# Patient Record
Sex: FEMALE | Race: WHITE | Employment: FULL TIME | URBAN - METROPOLITAN AREA
[De-identification: names, ages, dates, MRNs, and addresses within clinical notes are randomized per-mention and may not be internally consistent; named-entity substitution may affect disease eponyms.]

---

## 2017-01-26 ENCOUNTER — HOSPITAL ENCOUNTER (OUTPATIENT)
Dept: ULTRASOUND IMAGING | Age: 37
Discharge: HOME | End: 2017-01-26
Attending: OBSTETRICS & GYNECOLOGY | Admitting: OBSTETRICS & GYNECOLOGY

## 2017-01-26 DIAGNOSIS — R92.8 ABNORMAL MAMMOGRAM: ICD-10-CM

## 2017-01-26 DIAGNOSIS — Z12.31 VISIT FOR SCREENING MAMMOGRAM: ICD-10-CM

## 2017-12-14 ENCOUNTER — HOSPITAL ENCOUNTER (OUTPATIENT)
Dept: MAMMOGRAPHY | Age: 37
Discharge: HOME | End: 2017-12-14

## 2017-12-14 DIAGNOSIS — Z12.31 VISIT FOR SCREENING MAMMOGRAM: ICD-10-CM

## 2018-03-12 ENCOUNTER — HOSPITAL ENCOUNTER (OUTPATIENT)
Dept: ULTRASOUND IMAGING | Age: 38
Discharge: HOME | End: 2018-03-12
Attending: OBSTETRICS & GYNECOLOGY | Admitting: OBSTETRICS & GYNECOLOGY

## 2018-03-12 DIAGNOSIS — N92.0 EXCESSIVE OR FREQUENT MENSTRUATION: ICD-10-CM

## 2018-03-12 DIAGNOSIS — N92.0 EXCESSIVE AND FREQUENT MENSTRUATION WITH REGULAR CYCLE: ICD-10-CM

## 2020-02-05 LAB
ALBUMIN SERPL-MCNC: 4.6 G/DL
ALP BLD-CCNC: 100 U/L
ALT SERPL-CCNC: 30 U/L
ANION GAP SERPL CALCULATED.3IONS-SCNC: NORMAL MMOL/L
AST SERPL-CCNC: 17 U/L
AVERAGE GLUCOSE: NORMAL
BASOPHILS ABSOLUTE: 0.1 /ΜL
BASOPHILS RELATIVE PERCENT: 1 %
BILIRUB SERPL-MCNC: 0.2 MG/DL (ref 0.1–1.4)
BUN BLDV-MCNC: 12 MG/DL
CALCIUM SERPL-MCNC: 10.7 MG/DL
CHLORIDE BLD-SCNC: 105 MMOL/L
CHOLESTEROL, TOTAL: 165 MG/DL
CHOLESTEROL/HDL RATIO: 4.1
CO2: NORMAL
CREAT SERPL-MCNC: 0.8 MG/DL
EOSINOPHILS ABSOLUTE: 0.2 /ΜL
EOSINOPHILS RELATIVE PERCENT: 1 %
GFR CALCULATED: NORMAL
GLUCOSE BLD-MCNC: 98 MG/DL
HBA1C MFR BLD: 5.2 %
HCT VFR BLD CALC: 46.7 % (ref 36–46)
HDLC SERPL-MCNC: 40 MG/DL (ref 35–70)
HEMOGLOBIN: 14.9 G/DL (ref 12–16)
LDL CHOLESTEROL CALCULATED: 91 MG/DL (ref 0–160)
LYMPHOCYTES ABSOLUTE: 2.6 /ΜL
LYMPHOCYTES RELATIVE PERCENT: 23 %
MCH RBC QN AUTO: 26.5 PG
MCHC RBC AUTO-ENTMCNC: 31.9 G/DL
MCV RBC AUTO: 83 FL
MONOCYTES ABSOLUTE: 0.7 /ΜL
MONOCYTES RELATIVE PERCENT: 6 %
NEUTROPHILS ABSOLUTE: 7.5 /ΜL
NEUTROPHILS RELATIVE PERCENT: 68 %
PDW BLD-RTO: 13.8 %
PLATELET # BLD: 221 K/ΜL
PMV BLD AUTO: ABNORMAL FL
POTASSIUM SERPL-SCNC: 4.8 MMOL/L
RBC # BLD: 5.63 10^6/ΜL
SODIUM BLD-SCNC: 141 MMOL/L
TOTAL PROTEIN: 7.6
TRIGL SERPL-MCNC: 171 MG/DL
VLDLC SERPL CALC-MCNC: NORMAL MG/DL
WBC # BLD: 11.1 10^3/ML

## 2020-03-11 ENCOUNTER — OFFICE VISIT (OUTPATIENT)
Dept: INTERNAL MEDICINE CLINIC | Age: 40
End: 2020-03-11
Payer: COMMERCIAL

## 2020-03-11 VITALS
DIASTOLIC BLOOD PRESSURE: 84 MMHG | SYSTOLIC BLOOD PRESSURE: 132 MMHG | HEART RATE: 80 BPM | OXYGEN SATURATION: 99 % | HEIGHT: 68 IN | BODY MASS INDEX: 36.98 KG/M2 | WEIGHT: 244 LBS

## 2020-03-11 PROCEDURE — 99385 PREV VISIT NEW AGE 18-39: CPT | Performed by: INTERNAL MEDICINE

## 2020-03-11 RX ORDER — M-VIT,TX,IRON,MINS/CALC/FOLIC 27MG-0.4MG
1 TABLET ORAL DAILY
COMMUNITY

## 2020-03-11 ASSESSMENT — PATIENT HEALTH QUESTIONNAIRE - PHQ9
SUM OF ALL RESPONSES TO PHQ QUESTIONS 1-9: 0
1. LITTLE INTEREST OR PLEASURE IN DOING THINGS: 0
SUM OF ALL RESPONSES TO PHQ9 QUESTIONS 1 & 2: 0
2. FEELING DOWN, DEPRESSED OR HOPELESS: 0
SUM OF ALL RESPONSES TO PHQ QUESTIONS 1-9: 0

## 2020-03-11 NOTE — PROGRESS NOTES
WITH LEFT PARA-TUBAL CYSTECTOMY, AND LYSIS OF ADHESIONS.     LASIK      TYMPANOPLASTY  1993    ear drum age 10     Family History   Problem Relation Age of Onset    Heart Disease Father     Stroke Father     High Blood Pressure Sister     Heart Attack Maternal Grandmother     Heart Disease Maternal Grandmother     Heart Attack Maternal Grandfather     Heart Disease Maternal Grandfather     Heart Attack Paternal Grandmother     Heart Disease Paternal Grandmother      Social History     Socioeconomic History    Marital status: Single     Spouse name: Not on file    Number of children: Not on file    Years of education: Not on file    Highest education level: Not on file   Occupational History    Occupation: surgical dental assistant   Social Needs    Financial resource strain: Not on file    Food insecurity     Worry: Not on file     Inability: Not on file    Transportation needs     Medical: Not on file     Non-medical: Not on file   Tobacco Use    Smoking status: Never Smoker    Smokeless tobacco: Never Used   Substance and Sexual Activity    Alcohol use: Yes     Comment: socially    Drug use: No    Sexual activity: Yes   Lifestyle    Physical activity     Days per week: Not on file     Minutes per session: Not on file    Stress: Not on file   Relationships    Social connections     Talks on phone: Not on file     Gets together: Not on file     Attends Sabianism service: Not on file     Active member of club or organization: Not on file     Attends meetings of clubs or organizations: Not on file     Relationship status: Not on file    Intimate partner violence     Fear of current or ex partner: Not on file     Emotionally abused: Not on file     Physically abused: Not on file     Forced sexual activity: Not on file   Other Topics Concern    Not on file   Social History Narrative    Not on file       Review of Systems:  A comprehensive review of systems was negative except for what was noted in the HPI. Physical Exam:   Vitals:    03/11/20 1245   BP: 132/84   Pulse: 80   SpO2: 99%   Weight: 244 lb (110.7 kg)   Height: 5' 7.5\" (1.715 m)     Body mass index is 37.65 kg/m². Constitutional: She is oriented to person, place, and time. She appears well-developed and well-nourished. No distress. HEENT:   Head: Normocephalic and atraumatic. Right Ear: Tympanic membrane, external ear and ear canal normal.   Left Ear: Tympanic membrane, external ear and ear canal normal.   Mouth/Throat: Oropharynx is clear and moist, and mucous membranes are normal.  There is no cervical adenopathy. Eyes: Conjunctivae and extraocular motions are normal. Pupils are equal, round, and reactive to light. Neck: Supple. No JVD present. Carotid bruit is not present. No mass and no thyromegaly present. Cardiovascular: Normal rate, regular rhythm, normal heart sounds and intact distal pulses. Exam reveals no gallop and no friction rub. No murmur heard. Pulmonary/Chest: Effort normal and breath sounds normal. No respiratory distress. She has no wheezes, rhonchi or rales. Abdominal: Soft, non-tender. Bowel sounds and aorta are normal. She exhibits no organomegaly, mass or bruit. Genitourinary: performed by gynecologist.  Breast exam:  performed by specialist.  Musculoskeletal: Normal range of motion, no synovitis. She exhibits no edema. Neurological: She is alert and oriented to person, place, and time. She has normal reflexes. No cranial nerve deficit. Coordination normal.   Skin: Skin is warm and dry. There is no rash or erythema. No suspicious lesions noted. Psychiatric: She has a normal mood and affect.  Her speech is normal and behavior is normal. Judgment, cognition and memory are normal.       Preventive Care:  Health Maintenance   Topic Date Due    Varicella vaccine (1 of 2 - 2-dose childhood series) 06/15/1983    HIV screen  06/15/1997    Cervical cancer screen  06/15/2003    DTaP/Tdap/Td vaccine (2 - Td) 02/23/2022    Shingles Vaccine (1 of 2) 06/15/2032    Flu vaccine  Completed    Hepatitis A vaccine  Aged Out    Hepatitis B vaccine  Aged Out    Hib vaccine  Aged Out    Meningococcal (ACWY) vaccine  Aged Out    Pneumococcal 0-64 years Vaccine  Aged Out      Hx abnormal PAP: no  Sexual activity: has sex with males   Last eye exam: 2018, normal  Exercise: walks 3 time(s) per week       Preventive plan of care for Mary Jane Natarajan        3/11/2020           Preventive Measures Status       Recommendations for screening                   Diabetes Screen  Glucose (mg/dL)   Date Value   02/05/2020 98    Repeat yearly   Cholesterol Screen  Lab Results   Component Value Date    CHOL 165 02/05/2020    TRIG 171 02/05/2020    HDL 40 02/05/2020    LDLCALC 91 02/05/2020    Repeat yearly       Weight: Body mass index is 37.65 kg/m². 5' 7.5\" (1.715 m)244 lb (110.7 kg)    Your BMI is 25 or greater, which indicates that you are overweight        Recommended Immunizations    Immunization History   Administered Date(s) Administered    Influenza Virus Vaccine 10/01/2019        Influenza vaccine:  recommended every fall         Other Recommendations ·   See a dentist every 6 months  · Try to get at least 30 minutes of exercise 3-5 days per week  · Always wear a seat belt when traveling in a car  · Always wear a helmet when riding a bicycle or motorcycle  · When exposed to the sun, use a sunscreen that protects against both UVA and UVB radiation with an SPF of 30 or greater- reapply every 2 to 3 hours or after sweating, drying off with a towel, or swimming  · You need 500 mg of calcium and 1184-9662 IU of vitamin D per day- it is possible to meet your calcium requirement with diet alone, but a vitamin D supplement is usually necessary                 Assessment/Plan:    Henrietta Aldrich was seen today for establish care.     Diagnoses and all orders for this visit:    Annual physical exam        Return Fasting Physical in 1 year.

## 2022-02-07 ENCOUNTER — OFFICE VISIT (OUTPATIENT)
Dept: INTERNAL MEDICINE CLINIC | Age: 42
End: 2022-02-07
Payer: COMMERCIAL

## 2022-02-07 VITALS
WEIGHT: 253 LBS | OXYGEN SATURATION: 98 % | BODY MASS INDEX: 38.34 KG/M2 | HEART RATE: 91 BPM | SYSTOLIC BLOOD PRESSURE: 132 MMHG | DIASTOLIC BLOOD PRESSURE: 80 MMHG | HEIGHT: 68 IN

## 2022-02-07 DIAGNOSIS — F32.A ANXIETY AND DEPRESSION: ICD-10-CM

## 2022-02-07 DIAGNOSIS — F41.9 ANXIETY AND DEPRESSION: ICD-10-CM

## 2022-02-07 DIAGNOSIS — Z00.00 ENCOUNTER FOR WELL ADULT EXAM WITHOUT ABNORMAL FINDINGS: Primary | ICD-10-CM

## 2022-02-07 LAB
A/G RATIO: 1.4 (ref 1.1–2.2)
ALBUMIN SERPL-MCNC: 4.2 G/DL (ref 3.4–5)
ALP BLD-CCNC: 92 U/L (ref 40–129)
ALT SERPL-CCNC: 19 U/L (ref 10–40)
ANION GAP SERPL CALCULATED.3IONS-SCNC: 14 MMOL/L (ref 3–16)
AST SERPL-CCNC: 18 U/L (ref 15–37)
BASOPHILS ABSOLUTE: 0.1 K/UL (ref 0–0.2)
BASOPHILS RELATIVE PERCENT: 0.6 %
BILIRUB SERPL-MCNC: 0.3 MG/DL (ref 0–1)
BUN BLDV-MCNC: 15 MG/DL (ref 7–20)
CALCIUM SERPL-MCNC: 10.5 MG/DL (ref 8.3–10.6)
CHLORIDE BLD-SCNC: 108 MMOL/L (ref 99–110)
CHOLESTEROL, TOTAL: 188 MG/DL (ref 0–199)
CO2: 21 MMOL/L (ref 21–32)
CREAT SERPL-MCNC: 0.8 MG/DL (ref 0.6–1.1)
EOSINOPHILS ABSOLUTE: 0.1 K/UL (ref 0–0.6)
EOSINOPHILS RELATIVE PERCENT: 1.4 %
GFR AFRICAN AMERICAN: >60
GFR NON-AFRICAN AMERICAN: >60
GLUCOSE BLD-MCNC: 92 MG/DL (ref 70–99)
HCT VFR BLD CALC: 43.2 % (ref 36–48)
HDLC SERPL-MCNC: 39 MG/DL (ref 40–60)
HEMOGLOBIN: 14.1 G/DL (ref 12–16)
LDL CHOLESTEROL CALCULATED: 124 MG/DL
LYMPHOCYTES ABSOLUTE: 2.5 K/UL (ref 1–5.1)
LYMPHOCYTES RELATIVE PERCENT: 24.3 %
MCH RBC QN AUTO: 28 PG (ref 26–34)
MCHC RBC AUTO-ENTMCNC: 32.6 G/DL (ref 31–36)
MCV RBC AUTO: 85.7 FL (ref 80–100)
MONOCYTES ABSOLUTE: 0.6 K/UL (ref 0–1.3)
MONOCYTES RELATIVE PERCENT: 5.6 %
NEUTROPHILS ABSOLUTE: 7 K/UL (ref 1.7–7.7)
NEUTROPHILS RELATIVE PERCENT: 68.1 %
PDW BLD-RTO: 15.1 % (ref 12.4–15.4)
PLATELET # BLD: 196 K/UL (ref 135–450)
PMV BLD AUTO: 11.1 FL (ref 5–10.5)
POTASSIUM SERPL-SCNC: 4.4 MMOL/L (ref 3.5–5.1)
RBC # BLD: 5.04 M/UL (ref 4–5.2)
SODIUM BLD-SCNC: 143 MMOL/L (ref 136–145)
TOTAL PROTEIN: 7.2 G/DL (ref 6.4–8.2)
TRIGL SERPL-MCNC: 124 MG/DL (ref 0–150)
TSH REFLEX: 1.6 UIU/ML (ref 0.27–4.2)
VLDLC SERPL CALC-MCNC: 25 MG/DL
WBC # BLD: 10.2 K/UL (ref 4–11)

## 2022-02-07 PROCEDURE — 99395 PREV VISIT EST AGE 18-39: CPT | Performed by: INTERNAL MEDICINE

## 2022-02-07 PROCEDURE — 36415 COLL VENOUS BLD VENIPUNCTURE: CPT | Performed by: INTERNAL MEDICINE

## 2022-02-07 PROCEDURE — 99213 OFFICE O/P EST LOW 20 MIN: CPT | Performed by: INTERNAL MEDICINE

## 2022-02-07 SDOH — ECONOMIC STABILITY: FOOD INSECURITY: WITHIN THE PAST 12 MONTHS, YOU WORRIED THAT YOUR FOOD WOULD RUN OUT BEFORE YOU GOT MONEY TO BUY MORE.: NEVER TRUE

## 2022-02-07 SDOH — ECONOMIC STABILITY: FOOD INSECURITY: WITHIN THE PAST 12 MONTHS, THE FOOD YOU BOUGHT JUST DIDN'T LAST AND YOU DIDN'T HAVE MONEY TO GET MORE.: NEVER TRUE

## 2022-02-07 ASSESSMENT — PATIENT HEALTH QUESTIONNAIRE - PHQ9
SUM OF ALL RESPONSES TO PHQ QUESTIONS 1-9: 2
SUM OF ALL RESPONSES TO PHQ QUESTIONS 1-9: 2
SUM OF ALL RESPONSES TO PHQ9 QUESTIONS 1 & 2: 2
SUM OF ALL RESPONSES TO PHQ QUESTIONS 1-9: 2
2. FEELING DOWN, DEPRESSED OR HOPELESS: 2
SUM OF ALL RESPONSES TO PHQ QUESTIONS 1-9: 2
1. LITTLE INTEREST OR PLEASURE IN DOING THINGS: 0

## 2022-02-07 ASSESSMENT — SOCIAL DETERMINANTS OF HEALTH (SDOH): HOW HARD IS IT FOR YOU TO PAY FOR THE VERY BASICS LIKE FOOD, HOUSING, MEDICAL CARE, AND HEATING?: NOT HARD AT ALL

## 2022-02-07 NOTE — PATIENT INSTRUCTIONS
Body Mass Index: Care Instructions  Your Care Instructions     Body mass index (BMI) can help you see if your weight is raising your risk for health problems. It uses a formula to compare how much you weigh with how tall you are. · A BMI lower than 18.5 is considered underweight. · A BMI between 18.5 and 24.9 is considered healthy. · A BMI between 25 and 29.9 is considered overweight. A BMI of 30 or higher is considered obese. If your BMI is in the normal range, it means that you have a lower risk for weight-related health problems. If your BMI is in the overweight or obese range, you may be at increased risk for weight-related health problems, such as high blood pressure, heart disease, stroke, arthritis or joint pain, and diabetes. If your BMI is in the underweight range, you may be at increased risk for health problems such as fatigue, lower protection (immunity) against illness, muscle loss, bone loss, hair loss, and hormone problems. BMI is just one measure of your risk for weight-related health problems. You may be at higher risk for health problems if you are not active, you eat an unhealthy diet, or you drink too much alcohol or use tobacco products. Follow-up care is a key part of your treatment and safety. Be sure to make and go to all appointments, and call your doctor if you are having problems. It's also a good idea to know your test results and keep a list of the medicines you take. How can you care for yourself at home? · Practice healthy eating habits. This includes eating plenty of fruits, vegetables, whole grains, lean protein, and low-fat dairy. · If your doctor recommends it, get more exercise. Walking is a good choice. Bit by bit, increase the amount you walk every day. Try for at least 30 minutes on most days of the week. · Do not smoke. Smoking can increase your risk for health problems. If you need help quitting, talk to your doctor about stop-smoking programs and medicines. These can increase your chances of quitting for good. · Limit alcohol to 2 drinks a day for men and 1 drink a day for women. Too much alcohol can cause health problems. If you have a BMI higher than 25  · Your doctor may do other tests to check your risk for weight-related health problems. This may include measuring the distance around your waist. A waist measurement of more than 40 inches in men or 35 inches in women can increase the risk of weight-related health problems. · Talk with your doctor about steps you can take to stay healthy or improve your health. You may need to make lifestyle changes to lose weight and stay healthy, such as changing your diet and getting regular exercise. If you have a BMI lower than 18.5  · Your doctor may do other tests to check your risk for health problems. · Talk with your doctor about steps you can take to stay healthy or improve your health. You may need to make lifestyle changes to gain or maintain weight and stay healthy, such as getting more healthy foods in your diet and doing exercises to build muscle. Where can you learn more? Go to https://[employer].Delivered. org and sign in to your [employer] account. Enter S176 in the [employer] pte box to learn more about \"Body Mass Index: Care Instructions. \"     If you do not have an account, please click on the \"Sign Up Now\" link. Current as of: March 17, 2021               Content Version: 13.1  © 8281-0928 Healthwise, Incorporated. Care instructions adapted under license by [facility] ([facility]). If you have questions about a medical condition or this instruction, always ask your healthcare professional. [place] [address] any warranty or liability for your use of this information. Well Visit, Ages 25 to 48: Care Instructions  Overview     Well visits can help you stay healthy. Your doctor has checked your overall health and may have suggested ways to take good care of yourself.  Your doctor also may have recommended tests. At home, you can help prevent illness with healthy eating, regular exercise, and other steps. Follow-up care is a key part of your treatment and safety. Be sure to make and go to all appointments, and call your doctor if you are having problems. It's also a good idea to know your test results and keep a list of the medicines you take. How can you care for yourself at home? · Get screening tests that you and your doctor decide on. Screening helps find diseases before any symptoms appear. · Eat healthy foods. Choose fruits, vegetables, whole grains, protein, and low-fat dairy foods. Limit fat, especially saturated fat. Reduce salt in your diet. · Limit alcohol. If you are a man, have no more than 2 drinks a day or 14 drinks a week. If you are a woman, have no more than 1 drink a day or 7 drinks a week. · Get at least 30 minutes of physical activity on most days of the week. Walking is a good choice. You also may want to do other activities, such as running, swimming, cycling, or playing tennis or team sports. Discuss any changes in your exercise program with your doctor. · Reach and stay at a healthy weight. This will lower your risk for many problems, such as obesity, diabetes, heart disease, and high blood pressure. · Do not smoke or allow others to smoke around you. If you need help quitting, talk to your doctor about stop-smoking programs and medicines. These can increase your chances of quitting for good. · Care for your mental health. It is easy to get weighed down by worry and stress. Learn strategies to manage stress, like deep breathing and mindfulness, and stay connected with your family and community. If you find you often feel sad or hopeless, talk with your doctor. Treatment can help. · Talk to your doctor about whether you have any risk factors for sexually transmitted infections (STIs).  You can help prevent STIs if you wait to have sex with a new partner (or partners) until [facility] each been tested for STIs. It also helps if you use condoms (male or female condoms) and if you limit your sex partners to one person who only has sex with you. Vaccines are available for some STIs, such as HPV. · Use birth control if it's important to you to prevent pregnancy. Talk with your doctor about the choices available and what might be best for you. · If you think you may have a problem with alcohol or drug use, talk to your doctor. This includes prescription medicines (such as amphetamines and opioids) and illegal drugs (such as cocaine and methamphetamine). Your doctor can help you figure out what type of treatment is best for you. · Protect your skin from too much sun. When you're outdoors from 10 a.m. to 4 p.m., stay in the shade or cover up with clothing and a hat with a wide brim. Wear sunglasses that block UV rays. Even when it's cloudy, put broad-spectrum sunscreen (SPF 30 or higher) on any exposed skin. · See a dentist one or two times a year for checkups and to have your teeth cleaned. · Wear a seat belt in the car. When should you call for help? Watch closely for changes in your health, and be sure to contact your doctor if you have any problems or symptoms that concern you. Where can you learn more? Go to https://[employer] Medicalcheryleeb.healthNetlogpartners. org and sign in to your [employer] account. Enter P072 in the [facility] box to learn more about \"Well Visit, Ages 25 to 48: Care Instructions. \"     If you do not have an account, please click on the \"Sign Up Now\" link. Current as of: October 6, 2021               Content Version: 13.1  © 1445-0545 Healthwise, Incorporated. Care instructions adapted under license by [facility] ([facility]). If you have questions about a medical condition or this instruction, always ask your healthcare professional. [address] any warranty or liability for your use of this information.

## 2022-02-07 NOTE — PROGRESS NOTES
[facility]  History and Physical  Yonny Eric M.D.        Lynda Flores  YOB: 1980    Date of Service:  2/7/2022    Chief Complaint:   Lynda Flores is a 46 y.o. female who presents for   Chief Complaint   Patient presents with    Annual Exam    Depression    Anxiety       Assessment/Plan:    Shy Yanez was seen today for annual exam, depression and anxiety. Diagnoses and all orders for this visit:    Encounter for well adult exam without abnormal findings  -     Lipid Panel  -     Comprehensive Metabolic Panel  -     CBC Auto Differential  -     TSH with Reflex    Anxiety and depression  Start  sertraline (ZOLOFT) 50 MG tablet; Take 1/2-1 tablet by mouth daily      Return VV 1 month anxiety/depression. HPI: Here for Annual Physical and Follow up. Anxiety and depression for couple of years:  She's been more emotional and crying weekly with increase stress with her fiancee and divorce 2016 and only see her kids every other week. She's still able to get out of bed, clean the house and go to work. She's just not happy. No suicidal ideation or Homocidal ideation. She complain of panic attacks with increase heart rate intermittentlly but not last too long.     Lab Results   Component Value Date    LABA1C 5.2 02/05/2020     Lab Results   Component Value Date     02/05/2020    K 4.8 02/05/2020     02/05/2020    BUN 12 02/05/2020    CREATININE 0.80 02/05/2020    GLUCOSE 98 02/05/2020    CALCIUM 10.7 02/05/2020     Lab Results   Component Value Date    CHOL 165 02/05/2020    TRIG 171 02/05/2020    HDL 40 02/05/2020    LDLCALC 91 02/05/2020     Lab Results   Component Value Date    ALT 30 02/05/2020    AST 17 02/05/2020     No results found for: TSH, T4FREE  Lab Results   Component Value Date    WBC 11.1 02/05/2020    HGB 14.9 02/05/2020    HCT 46.7 (A) 02/05/2020    MCV 83 02/05/2020     02/05/2020     No results found for: INR   No results found for: PSA   No results found for: LABURIC     Wt Readings from Last 3 Encounters:   22 253 lb (114.8 kg)   20 244 lb (110.7 kg)   09/15/16 234 lb (106.1 kg)     BP Readings from Last 3 Encounters:   22 132/80   20 132/84   09/15/16 143/75       Patient Active Problem List   Diagnosis    Adnexal cyst    Menorrhagia       No Known Allergies  No outpatient medications have been marked as taking for the 22 encounter (Office Visit) with Castillo Clay MD.       History reviewed. No pertinent past medical history. Past Surgical History:   Procedure Laterality Date     SECTION  , 2012    x 2    DILATION AND CURETTAGE OF UTERUS N/A 09/15/2016    DILATATION AND CURETTAGE, HYSTEROSCOPY,    HYSTERECTOMY, VAGINAL  2018    LAPAROSCOPY N/A 09/15/2016    WITH LEFT PARA-TUBAL CYSTECTOMY, AND LYSIS OF ADHESIONS.     LASIK      TYMPANOPLASTY  1993    ear drum age 10     Family History   Problem Relation Age of Onset    Breast Cancer Mother     Heart Disease Father     Stroke Father     Heart Attack Father 48    High Blood Pressure Father     Heart Attack Maternal Grandmother 61    Heart Disease Maternal Grandmother     High Blood Pressure Maternal Grandmother     Heart Attack Paternal Grandmother 61    Heart Disease Paternal Grandmother     High Blood Pressure Paternal Grandmother     Heart Attack Paternal Grandfather 79    Heart Disease Paternal Grandfather     High Blood Pressure Paternal Grandfather      Social History     Socioeconomic History    Marital status: Single     Spouse name: Not on file    Number of children: Not on file    Years of education: Not on file    Highest education level: Not on file   Occupational History    Occupation: surgical dental assistant   Tobacco Use    Smoking status: Never Smoker    Smokeless tobacco: Never Used   Vaping Use    Vaping Use: Never used   Substance and Sexual Activity    Alcohol use: Yes     Comment: socially  Drug use: No    Sexual activity: Yes   Other Topics Concern    Not on file   Social History Narrative    Not on file     Social Determinants of Health     Financial Resource Strain: Low Risk     Difficulty of Paying Living Expenses: Not hard at all   Food Insecurity: No Food Insecurity    Worried About Running Out of Food in the Last Year: Never true    [address] in the Last Year: Never true   Transportation Needs:     Lack of Transportation (Medical): Not on file    Lack of Transportation (Non-Medical): Not on file   Physical Activity:     Days of Exercise per Week: Not on file    Minutes of Exercise per Session: Not on file   Stress:     Feeling of Stress : Not on file   Social Connections:     Frequency of Communication with Friends and Family: Not on file    Frequency of Social Gatherings with Friends and Family: Not on file    Attends Christianity Services: Not on file    Active Member of [address] [place] [address] [employer] or Organizations: Not on file    Attends Club or Organization Meetings: Not on file    Marital Status: Not on file   Intimate Partner Violence:     Fear of Current or Ex-Partner: Not on file    Emotionally Abused: Not on file    Physically Abused: Not on file    Sexually Abused: Not on file   Housing Stability:     Unable to Pay for Housing in the Last Year: Not on file    Number of [place] in the Last Year: Not on file    Unstable Housing in the Last Year: Not on file       Review of Systems:  A comprehensive review of systems was negative except for what was noted in the HPI. Physical Exam:   Vitals:    02/07/22 0925   BP: 132/80   Pulse: 91   SpO2: 98%   Weight: 253 lb (114.8 kg)   Height: 5' 7.5\" (1.715 m)     Body mass index is 39.04 kg/m². Constitutional: She is oriented to person, place, and time. She appears well-developed and well-nourished. No distress. HEENT:   Head: Normocephalic and atraumatic.    Right Ear: Tympanic membrane, external ear and ear canal normal. Left Ear: Tympanic membrane, external ear and ear canal normal.   Mouth/Throat: Oropharynx is clear and moist, and mucous membranes are normal.  There is no cervical adenopathy. Eyes: Conjunctivae and extraocular motions are normal. Pupils are equal, round, and reactive to light. Neck: Supple. No JVD present. Carotid bruit is not present. No mass and no thyromegaly present. Cardiovascular: Normal rate, regular rhythm, normal heart sounds and intact distal pulses. Pulmonary/Chest: Effort normal and breath sounds normal. No respiratory distress. She has no wheezes, rhonchi or rales. Abdominal: Soft, non-tender. Bowel sounds and aorta are normal. She exhibits no organomegaly, mass or bruit. Musculoskeletal: Normal range of motion, no synovitis. She exhibits no edema. Neurological: She is alert and oriented to person, place, and time. She has normal reflexes. No cranial nerve deficit. Coordination normal.   Skin: Skin is warm and dry. There is no rash or erythema. No suspicious lesions noted.        Preventive Care:  Health Maintenance   Topic Date Due    Depression Screen  Never done    COVID-19 Vaccine (3 - Booster for Pfizer series) 07/23/2021    DTaP/Tdap/Td vaccine (2 - Td or Tdap) 02/23/2022    Breast cancer screen  06/15/2022    Flu vaccine  Completed    Hepatitis A vaccine  Aged Out    Hepatitis B vaccine  Aged Out    Hib vaccine  Aged Out    Meningococcal (ACWY) vaccine  Aged Out    Pneumococcal 0-64 years Vaccine  Aged Out    Varicella vaccine  Discontinued    Hepatitis C screen  Discontinued    HIV screen  Discontinued        Recommendations for Preventive Services Due: see orders and patient instructions/AVS.

## 2022-02-08 LAB — MAMMOGRAPHY, EXTERNAL: NORMAL

## 2022-03-07 ENCOUNTER — TELEMEDICINE (OUTPATIENT)
Dept: INTERNAL MEDICINE CLINIC | Age: 42
End: 2022-03-07
Payer: COMMERCIAL

## 2022-03-07 DIAGNOSIS — F32.A ANXIETY AND DEPRESSION: ICD-10-CM

## 2022-03-07 DIAGNOSIS — F41.9 ANXIETY AND DEPRESSION: ICD-10-CM

## 2022-03-07 PROCEDURE — 99213 OFFICE O/P EST LOW 20 MIN: CPT | Performed by: INTERNAL MEDICINE

## 2022-03-07 NOTE — PROGRESS NOTES
14.1 02/07/2022    HCT 43.2 02/07/2022    MCV 85.7 02/07/2022     02/07/2022     No results found for: INR   No results found for: PSA   No results found for: [facility]     Patient Active Problem List   Diagnosis    Adnexal cyst    Menorrhagia       No Known Allergies  Outpatient Medications Marked as Taking for the 3/7/22 encounter (Telemedicine) with Santiago Pruitt MD   Medication Sig Dispense Refill    sertraline (ZOLOFT) 50 MG tablet Take 1 tablet by mouth daily 30 tablet 0    Multiple Vitamins-Minerals (THERAPEUTIC MULTIVITAMIN-MINERALS) tablet Take 1 tablet by mouth daily           Review of Systems: 14 systems were negative except of what was stated on HPI    Nursing note and vitals reviewed. There were no vitals filed for this visit. Wt Readings from Last 3 Encounters:   02/07/22 253 lb (114.8 kg)   03/11/20 244 lb (110.7 kg)   09/15/16 234 lb (106.1 kg)     BP Readings from Last 3 Encounters:   02/07/22 132/80   03/11/20 132/84   09/15/16 143/75     There is no height or weight on file to calculate BMI. Constitutional: Patient appears well-developed and well-nourished. No distress. Head: Normocephalic and atraumatic. Psychiatric: Normal mood and affect.  Behavior is normal.

## 2022-04-04 ENCOUNTER — TELEMEDICINE (OUTPATIENT)
Dept: INTERNAL MEDICINE CLINIC | Age: 42
End: 2022-04-04
Payer: COMMERCIAL

## 2022-04-04 DIAGNOSIS — F32.A ANXIETY AND DEPRESSION: ICD-10-CM

## 2022-04-04 DIAGNOSIS — F41.9 ANXIETY AND DEPRESSION: ICD-10-CM

## 2022-04-04 PROCEDURE — 99213 OFFICE O/P EST LOW 20 MIN: CPT | Performed by: INTERNAL MEDICINE

## 2022-04-04 NOTE — PROGRESS NOTES
Pursuant to the emergency declaration under the [address] [place] [address] [facility] authority and the [employer] and [employer] Act, this Virtual  Video Visit was conducted, with patient's consent, to reduce the patient's risk of exposure to COVID-19 and provide continuity of care. Service is  provided through a video synchronous discussion virtually to substitute for in-person clinic visit with the patient being at home and Dr. Devonte Botello being at home. Patient consent to the video visit. Date of Service:  4/4/2022    Chief Complaint:      Chief Complaint   Patient presents with    Anxiety     1 mo f/up       Assessment/Plan:    Reji was seen today for anxiety. Diagnoses and all orders for this visit:    Anxiety and depression  -     sertraline (ZOLOFT) 50 MG tablet; Take 1 tablet by mouth at bedtime    Stable and continue on current medications. Return VV June 27 at 2 Anxiety. HPI:  Fidelina Vanessa is a 46 y.o. Anxiety and depression for couple of years:  resolve on depression on Zoloft 50 mg evening. No more anxiety with palpitations and shortness of breath. No more emotional and crying weekly and she does feel happy.      Lab Results   Component Value Date    LABA1C 5.2 02/05/2020     Lab Results   Component Value Date     02/07/2022    K 4.4 02/07/2022     02/07/2022    CO2 21 02/07/2022    BUN 15 02/07/2022    CREATININE 0.8 02/07/2022    GLUCOSE 92 02/07/2022    CALCIUM 10.5 02/07/2022     Lab Results   Component Value Date    CHOL 188 02/07/2022    TRIG 124 02/07/2022    HDL 39 02/07/2022    LDLCALC 124 02/07/2022     Lab Results   Component Value Date    ALT 19 02/07/2022    AST 18 02/07/2022     No results found for: TSH, T4FREE, T3FREE  Lab Results   Component Value Date    WBC 10.2 02/07/2022    HGB 14.1 02/07/2022    HCT 43.2 02/07/2022    MCV 85.7 02/07/2022     02/07/2022     No results found for: INR   No results found for: PSA   No results found for: [facility]     Patient Active Problem List   Diagnosis    Adnexal cyst    Menorrhagia       No Known Allergies  Outpatient Medications Marked as Taking for the 4/4/22 encounter (Telemedicine) with Dennis Pruitt MD   Medication Sig Dispense Refill    sertraline (ZOLOFT) 50 MG tablet Take 1 tablet by mouth daily (Patient taking differently: Take 50 mg by mouth at bedtime ) 30 tablet 0    Multiple Vitamins-Minerals (THERAPEUTIC MULTIVITAMIN-MINERALS) tablet Take 1 tablet by mouth daily           Review of Systems: 14 systems were negative except of what was stated on HPI    Nursing note and vitals reviewed. There were no vitals filed for this visit. Wt Readings from Last 3 Encounters:   02/07/22 253 lb (114.8 kg)   03/11/20 244 lb (110.7 kg)   09/15/16 234 lb (106.1 kg)     BP Readings from Last 3 Encounters:   02/07/22 132/80   03/11/20 132/84   09/15/16 143/75     There is no height or weight on file to calculate BMI. Constitutional: Patient appears well-developed and well-nourished. No distress. Head: Normocephalic and atraumatic. Psychiatric: Normal mood and affect.  Behavior is normal.

## 2022-05-26 ENCOUNTER — TELEMEDICINE (OUTPATIENT)
Dept: INTERNAL MEDICINE CLINIC | Age: 42
End: 2022-05-26
Payer: COMMERCIAL

## 2022-05-26 DIAGNOSIS — J06.9 URI WITH COUGH AND CONGESTION: Primary | ICD-10-CM

## 2022-05-26 PROCEDURE — 99212 OFFICE O/P EST SF 10 MIN: CPT | Performed by: INTERNAL MEDICINE

## 2022-05-26 NOTE — LETTER
[address], [facility] [address] [address]  Phone: [phone]  Fax: [phone]    Iwona Mosqueda MD        May 26, 2022     Patient: Garry Randolph   YOB: 1980   Date of Visit: 5/26/2022       To Whom It May Concern: It is my medical opinion that Garry Randolph should remain out of work until 5/31/2022. If you have any questions or concerns, please don't hesitate to call. Sincerely,    .       Iwona Mosqueda MD

## 2022-05-26 NOTE — PROGRESS NOTES
Pursuant to the emergency declaration under the [address] [facility], [facility] waiver authority and the for; [employer] and [employer] Act, this Virtual  Video Visit was conducted, with patient's consent, to reduce the patient's risk of exposure to COVID-19 and provide continuity of care. Service is  provided through a video synchronous discussion virtually to substitute for in-person clinic visit with the patient being at home and Dr. Kourtney Garcia being at home. Patient consent to the video visit. Date of Service:  5/26/2022    Chief Complaint:      Chief Complaint   Patient presents with    Sinusitis     cough,-taking augmentin and flonase for sinusitis for 3 days ([facility])       Assessment/Plan:    Fuad Sepulveda was seen today for sinusitis. Diagnoses and all orders for this visit:    URI with cough and congestion    Finish up Augmentin  If you're still having congestion, buy some over the counter Mucinex 1200 mg or Mucinex DM 1200 mg (if coughing) 1 pill twice a day  to thin up your secretions so it can drain to relieve pressure in your face/ears. Return if symptoms worsen or fail to improve. HPI:  Ciara Sebastian is a 46 y.o. She went to [facility] and given Flonase and Augmentin bid. She's coughing but not been looking at it. Clear nasal discharge.    complain of chest tightness    Lab Results   Component Value Date    LABA1C 5.2 02/05/2020     Lab Results   Component Value Date     02/07/2022    K 4.4 02/07/2022     02/07/2022    CO2 21 02/07/2022    BUN 15 02/07/2022    CREATININE 0.8 02/07/2022    GLUCOSE 92 02/07/2022    CALCIUM 10.5 02/07/2022     Lab Results   Component Value Date    CHOL 188 02/07/2022    TRIG 124 02/07/2022    HDL 39 02/07/2022    LDLCALC 124 02/07/2022     Lab Results   Component Value Date    ALT 19 02/07/2022    AST 18 02/07/2022     No results found for: TSH, T4FREE, T3FREE  Lab Results   Component Value Date    WBC 10.2 02/07/2022    HGB 14.1 02/07/2022    HCT 43.2 02/07/2022    MCV 85.7 02/07/2022     02/07/2022     No results found for: INR   No results found for: PSA   No results found for: [facility]     Patient Active Problem List   Diagnosis    Adnexal cyst    Menorrhagia       No Known Allergies  Outpatient Medications Marked as Taking for the 5/26/22 encounter (Telemedicine) with Lidia Pruitt MD   Medication Sig Dispense Refill    sertraline (ZOLOFT) 50 MG tablet Take 1 tablet by mouth at bedtime 90 tablet 0    Multiple Vitamins-Minerals (THERAPEUTIC MULTIVITAMIN-MINERALS) tablet Take 1 tablet by mouth daily           Review of Systems: 14 systems were negative except of what was stated on HPI    Nursing note and vitals reviewed. There were no vitals filed for this visit. Wt Readings from Last 3 Encounters:   02/07/22 253 lb (114.8 kg)   03/11/20 244 lb (110.7 kg)   09/15/16 234 lb (106.1 kg)     BP Readings from Last 3 Encounters:   02/07/22 132/80   03/11/20 132/84   09/15/16 143/75     There is no height or weight on file to calculate BMI. Constitutional: Patient appears well-developed and well-nourished. No distress. Head: Normocephalic and atraumatic. Psychiatric: Normal mood and affect.  Behavior is normal.

## 2022-07-18 ENCOUNTER — TELEMEDICINE (OUTPATIENT)
Dept: INTERNAL MEDICINE CLINIC | Age: 42
End: 2022-07-18
Payer: COMMERCIAL

## 2022-07-18 DIAGNOSIS — F32.A ANXIETY AND DEPRESSION: ICD-10-CM

## 2022-07-18 DIAGNOSIS — F41.9 ANXIETY AND DEPRESSION: ICD-10-CM

## 2022-07-18 PROCEDURE — 99213 OFFICE O/P EST LOW 20 MIN: CPT | Performed by: INTERNAL MEDICINE

## 2022-07-18 NOTE — PROGRESS NOTES
Pursuant to the emergency declaration under the [address] [facility], [facility] waiver authority and the [employer] and [employer] Act, this Virtual  Video Visit was conducted, with patient's consent, to reduce the patient's risk of exposure to COVID-19 and provide continuity of care. Service is  provided through a video synchronous discussion virtually to substitute for in-person clinic visit with the patient being at home and Dr. Cecilio Olivarez being at home. Patient consent to the video visit. Date of Service:  7/18/2022    Chief Complaint:      Chief Complaint   Patient presents with    Medication Refill    Anxiety       Assessment/Plan:    Low Oro was seen today for medication refill and anxiety. Diagnoses and all orders for this visit:    Anxiety and depression  -     sertraline (ZOLOFT) 50 MG tablet; Take 1 tablet by mouth at bedtime    Stable and continue on current medications. Return Jan 19 at 7:50 Fasting Physical.      HPI:  Khai Ernandez is a 38 y.o. Anxiety and depression for couple of years:  resolve on Zoloft 50 mg evening. No more anxiety with palpitations and shortness of breath. No more emotional and crying weekly and she does feel happy.      Lab Results   Component Value Date    LABA1C 5.2 02/05/2020     Lab Results   Component Value Date     02/07/2022    K 4.4 02/07/2022     02/07/2022    CO2 21 02/07/2022    BUN 15 02/07/2022    CREATININE 0.8 02/07/2022    GLUCOSE 92 02/07/2022    CALCIUM 10.5 02/07/2022     Lab Results   Component Value Date/Time    CHOL 188 02/07/2022 09:56 AM    TRIG 124 02/07/2022 09:56 AM    HDL 39 02/07/2022 09:56 AM    LDLCALC 124 02/07/2022 09:56 AM     Lab Results   Component Value Date    ALT 19 02/07/2022    AST 18 02/07/2022     No results found for: TSH, T4FREE, T3FREE  Lab Results   Component Value Date    WBC 10.2 02/07/2022    HGB 14.1 02/07/2022    HCT 43.2 02/07/2022 MCV 85.7 02/07/2022     02/07/2022     No results found for: INR   No results found for: PSA   No results found for: [facility]     Patient Active Problem List   Diagnosis    Adnexal cyst    Menorrhagia       No Known Allergies  Outpatient Medications Marked as Taking for the 7/18/22 encounter (Telemedicine) with Makayla Pruitt MD   Medication Sig Dispense Refill    sertraline (ZOLOFT) 50 MG tablet Take 1 tablet by mouth at bedtime 90 tablet 0    Multiple Vitamins-Minerals (THERAPEUTIC MULTIVITAMIN-MINERALS) tablet Take 1 tablet by mouth daily           Review of Systems: 14 systems were negative except of what was stated on HPI    Nursing note and vitals reviewed. There were no vitals filed for this visit. Wt Readings from Last 3 Encounters:   02/07/22 253 lb (114.8 kg)   03/11/20 244 lb (110.7 kg)   09/15/16 234 lb (106.1 kg)     BP Readings from Last 3 Encounters:   02/07/22 132/80   03/11/20 132/84   09/15/16 143/75     There is no height or weight on file to calculate BMI. Constitutional: Patient appears well-developed and well-nourished. No distress. Head: Normocephalic and atraumatic. Psychiatric: Normal mood and affect.  Behavior is normal.

## 2022-07-27 ENCOUNTER — TELEMEDICINE (OUTPATIENT)
Dept: INTERNAL MEDICINE CLINIC | Age: 42
End: 2022-07-27
Payer: COMMERCIAL

## 2022-07-27 DIAGNOSIS — R19.7 DIARRHEA, UNSPECIFIED TYPE: Primary | ICD-10-CM

## 2022-07-27 DIAGNOSIS — R11.2 NAUSEA AND VOMITING, INTRACTABILITY OF VOMITING NOT SPECIFIED, UNSPECIFIED VOMITING TYPE: ICD-10-CM

## 2022-07-27 PROCEDURE — 99212 OFFICE O/P EST SF 10 MIN: CPT | Performed by: INTERNAL MEDICINE

## 2022-07-27 NOTE — PROGRESS NOTES
Pursuant to the emergency declaration under the [address] [facility], [facility] waiver authority and the [employer] and [employer] Act, this Virtual  Video Visit was conducted, with patient's consent, to reduce the patient's risk of exposure to COVID-19 and provide continuity of care. Service is  provided through a video synchronous discussion virtually to substitute for in-person clinic visit with the patient being at home and Dr. Karina Valera being at home. Patient consent to the video visit. Date of Service:  7/27/2022    Chief Complaint:      Chief Complaint   Patient presents with    Diarrhea     Took a Covid test today at [facility],-NEGATIVE    Nausea & Vomiting     2 times this am    Letter for School/Work     Needs work note for today       Assessment/Plan:    Kristina Silva was seen today for diarrhea, nausea & vomiting and letter for school/work. Diagnoses and all orders for this visit:    Diarrhea, unspecified type    Nausea and vomiting, intractability of vomiting not specified, unspecified vomiting type    Increase fluids and keep hydrated. If still symptomatic, repeat Covid 19 test.    Return if symptoms worsen or fail to improve. HPI:  Hiren Wright is a 38 y.o. She complain waking up this morning with diarrhea, nausea, vomitting and felt hot and cold. She denies any fever or chills and felling much better now after taking some over the counter pink medication. Home Covid 19 test is negative today. Needs a note back to work tomorrow.     Lab Results   Component Value Date    LABA1C 5.2 02/05/2020     Lab Results   Component Value Date     02/07/2022    K 4.4 02/07/2022     02/07/2022    CO2 21 02/07/2022    BUN 15 02/07/2022    CREATININE 0.8 02/07/2022    GLUCOSE 92 02/07/2022    CALCIUM 10.5 02/07/2022     Lab Results   Component Value Date/Time    CHOL 188 02/07/2022 09:56 AM    TRIG 124 02/07/2022 09:56 AM    HDL 39 02/07/2022 09:56 AM    LDLCALC 124 02/07/2022 09:56 AM     Lab Results   Component Value Date    ALT 19 02/07/2022    AST 18 02/07/2022     No results found for: TSH, T4FREE, T3FREE  Lab Results   Component Value Date    WBC 10.2 02/07/2022    HGB 14.1 02/07/2022    HCT 43.2 02/07/2022    MCV 85.7 02/07/2022     02/07/2022     No results found for: INR   No results found for: PSA   No results found for: [facility]     Patient Active Problem List   Diagnosis    Adnexal cyst    Menorrhagia       No Known Allergies  Outpatient Medications Marked as Taking for the 7/27/22 encounter (Telemedicine) with Inocencia Pruitt MD   Medication Sig Dispense Refill    sertraline (ZOLOFT) 50 MG tablet Take 1 tablet by mouth at bedtime 90 tablet 1    Multiple Vitamins-Minerals (THERAPEUTIC MULTIVITAMIN-MINERALS) tablet Take 1 tablet by mouth daily           Review of Systems: 14 systems were negative except of what was stated on HPI    Nursing note and vitals reviewed. There were no vitals filed for this visit. Wt Readings from Last 3 Encounters:   02/07/22 253 lb (114.8 kg)   03/11/20 244 lb (110.7 kg)   09/15/16 234 lb (106.1 kg)     BP Readings from Last 3 Encounters:   02/07/22 132/80   03/11/20 132/84   09/15/16 143/75     There is no height or weight on file to calculate BMI. Constitutional: Patient appears well-developed and well-nourished. No distress. Head: Normocephalic and atraumatic. Psychiatric: Normal mood and affect.  Behavior is normal.

## 2022-07-27 NOTE — LETTER
[address] [address]  Phone: [phone]  Fax: [phone]    Brandie Olivera MD        July 27, 2022     Patient: Wendi Giang   YOB: 1980   Date of Visit: 7/27/2022       To Whom It May Concern: It is my medical opinion that Wendi Giang can return to work 7/28/22 if asymptomatic. If you have any questions or concerns, please don't hesitate to call. Sincerely,    .       Brandie Olivera MD

## 2022-11-15 ENCOUNTER — COMMUNITY OUTREACH (OUTPATIENT)
Dept: INTERNAL MEDICINE CLINIC | Age: 42
End: 2022-11-15

## 2022-11-15 NOTE — PROGRESS NOTES
Care Everywhere audit shows patient had a mammogram done 02/08/2022. Health maintenance has been updated.

## 2022-11-22 ENCOUNTER — TELEMEDICINE (OUTPATIENT)
Dept: INTERNAL MEDICINE CLINIC | Age: 42
End: 2022-11-22
Payer: COMMERCIAL

## 2022-11-22 DIAGNOSIS — J06.9 URI WITH COUGH AND CONGESTION: Primary | ICD-10-CM

## 2022-11-22 PROCEDURE — 99213 OFFICE O/P EST LOW 20 MIN: CPT | Performed by: INTERNAL MEDICINE

## 2022-11-22 RX ORDER — ALBUTEROL SULFATE 90 UG/1
2 AEROSOL, METERED RESPIRATORY (INHALATION) 4 TIMES DAILY PRN
Qty: 18 G | Refills: 0 | Status: SHIPPED | OUTPATIENT
Start: 2022-11-22

## 2022-11-22 NOTE — PROGRESS NOTES
Pursuant to the emergency declaration under the [address] [facility], [facility] waiver authority and the [employer] and [employer] Act, this Virtual  Video Visit was conducted, with patient's consent, to reduce the patient's risk of exposure to COVID-19 and provide continuity of care. Service is  provided through a video synchronous discussion virtually to substitute for in-person clinic visit with the patient being at home and Dr. Chente Gil being at home. Patient consent to the video visit. Date of Service:  11/22/2022    Chief Complaint:      Chief Complaint   Patient presents with    Cough    Chest Congestion     Symptoms onset yesterday       Assessment/Plan:    Shanique Swartz was seen today for cough and chest congestion. Diagnoses and all orders for this visit:    URI with cough and congestion  -     albuterol sulfate HFA (VENTOLIN HFA) 108 (90 Base) MCG/ACT inhaler; Inhale 2 puffs into the lungs 4 times daily as needed for Wheezing      Return if symptoms worsen or fail to improve. HPI:  Wendy Armendariz is a 38 y.o. She woke up with a dry cough yesterday. She did have some hot and cold but not anymore. Her chest feels heavy. She is shortness of breath and wheezing. There is bilateral face pressure. 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     Lab Results   Component Value Date    LABA1C 5.2 02/05/2020     Lab Results   Component Value Date     02/07/2022    K 4.4 02/07/2022     02/07/2022    CO2 21 02/07/2022    BUN 15 02/07/2022    CREATININE 0.8 02/07/2022    GLUCOSE 92 02/07/2022    CALCIUM 10.5 02/07/2022     Lab Results   Component Value Date/Time    CHOL 188 02/07/2022 09:56 AM    TRIG 124 02/07/2022 09:56 AM    HDL 39 02/07/2022 09:56 AM    LDLCALC 124 02/07/2022 09:56 AM     Lab Results   Component Value Date    ALT 19 02/07/2022    AST 18 02/07/2022     No results found for: TSH, T4FREE, T3FREE  Lab Results   Component Value Date    WBC 10.2 02/07/2022    HGB 14.1 02/07/2022    HCT 43.2 02/07/2022    MCV 85.7 02/07/2022     02/07/2022     No results found for: INR   No results found for: PSA   No results found for: [facility]     Patient Active Problem List   Diagnosis    Adnexal cyst    Menorrhagia       No Known Allergies  Outpatient Medications Marked as Taking for the 11/22/22 encounter (Telemedicine) with Ricardo Pruitt MD   Medication Sig Dispense Refill    sertraline (ZOLOFT) 50 MG tablet Take 1 tablet by mouth at bedtime 90 tablet 1    Multiple Vitamins-Minerals (THERAPEUTIC MULTIVITAMIN-MINERALS) tablet Take 1 tablet by mouth daily           Review of Systems: 14 systems were negative except of what was stated on HPI    Nursing note and vitals reviewed. There were no vitals filed for this visit. Wt Readings from Last 3 Encounters:   02/07/22 253 lb (114.8 kg)   03/11/20 244 lb (110.7 kg)   09/15/16 234 lb (106.1 kg)     BP Readings from Last 3 Encounters:   02/07/22 132/80   03/11/20 132/84   09/15/16 143/75     There is no height or weight on file to calculate BMI. Constitutional: Patient appears well-developed and well-nourished. No distress. Head: Normocephalic and atraumatic. Psychiatric: Normal mood and affect.  Behavior is normal.

## 2022-11-22 NOTE — LETTER
[address] [address] [address]  Phone: [phone]  Fax: [phone]    Pat Nuñez MD        November 22, 2022     Patient: Randolph Sanchez   YOB: 1980   Date of Visit: 11/22/2022       To Whom It May Concern: It is my medical opinion that Randolph Sanchez be off work 11/22/22 to 11/23/22. If you have any questions or concerns, please don't hesitate to call. Sincerely,    .  .     Pat Nuñez MD

## 2022-12-14 DIAGNOSIS — J06.9 URI WITH COUGH AND CONGESTION: ICD-10-CM

## 2022-12-14 RX ORDER — ALBUTEROL SULFATE 90 UG/1
2 AEROSOL, METERED RESPIRATORY (INHALATION) 4 TIMES DAILY PRN
Qty: 8.5 EACH | OUTPATIENT
Start: 2022-12-14

## 2023-01-16 DIAGNOSIS — F32.A ANXIETY AND DEPRESSION: ICD-10-CM

## 2023-01-16 DIAGNOSIS — F41.9 ANXIETY AND DEPRESSION: ICD-10-CM

## 2023-01-30 ENCOUNTER — OFFICE VISIT (OUTPATIENT)
Dept: INTERNAL MEDICINE CLINIC | Age: 43
End: 2023-01-30
Payer: COMMERCIAL

## 2023-01-30 VITALS
HEIGHT: 68 IN | HEART RATE: 79 BPM | DIASTOLIC BLOOD PRESSURE: 84 MMHG | OXYGEN SATURATION: 97 % | WEIGHT: 253 LBS | SYSTOLIC BLOOD PRESSURE: 138 MMHG | BODY MASS INDEX: 38.34 KG/M2

## 2023-01-30 DIAGNOSIS — F41.9 ANXIETY: ICD-10-CM

## 2023-01-30 DIAGNOSIS — Z00.00 ENCOUNTER FOR WELL ADULT EXAM WITHOUT ABNORMAL FINDINGS: Primary | ICD-10-CM

## 2023-01-30 DIAGNOSIS — Z23 NEED FOR PROPHYLACTIC VACCINATION WITH TETANUS-DIPHTHERIA (TD): ICD-10-CM

## 2023-01-30 PROCEDURE — 90715 TDAP VACCINE 7 YRS/> IM: CPT | Performed by: INTERNAL MEDICINE

## 2023-01-30 PROCEDURE — 90471 IMMUNIZATION ADMIN: CPT | Performed by: INTERNAL MEDICINE

## 2023-01-30 PROCEDURE — 99396 PREV VISIT EST AGE 40-64: CPT | Performed by: INTERNAL MEDICINE

## 2023-01-30 ASSESSMENT — PATIENT HEALTH QUESTIONNAIRE - PHQ9
SUM OF ALL RESPONSES TO PHQ9 QUESTIONS 1 & 2: 0
4. FEELING TIRED OR HAVING LITTLE ENERGY: 0
SUM OF ALL RESPONSES TO PHQ QUESTIONS 1-9: 0
6. FEELING BAD ABOUT YOURSELF - OR THAT YOU ARE A FAILURE OR HAVE LET YOURSELF OR YOUR FAMILY DOWN: 0
5. POOR APPETITE OR OVEREATING: 0
8. MOVING OR SPEAKING SO SLOWLY THAT OTHER PEOPLE COULD HAVE NOTICED. OR THE OPPOSITE, BEING SO FIGETY OR RESTLESS THAT YOU HAVE BEEN MOVING AROUND A LOT MORE THAN USUAL: 0
SUM OF ALL RESPONSES TO PHQ QUESTIONS 1-9: 0
3. TROUBLE FALLING OR STAYING ASLEEP: 0
7. TROUBLE CONCENTRATING ON THINGS, SUCH AS READING THE NEWSPAPER OR WATCHING TELEVISION: 0
9. THOUGHTS THAT YOU WOULD BE BETTER OFF DEAD, OR OF HURTING YOURSELF: 0
10. IF YOU CHECKED OFF ANY PROBLEMS, HOW DIFFICULT HAVE THESE PROBLEMS MADE IT FOR YOU TO DO YOUR WORK, TAKE CARE OF THINGS AT HOME, OR GET ALONG WITH OTHER PEOPLE: 0
SUM OF ALL RESPONSES TO PHQ QUESTIONS 1-9: 0
SUM OF ALL RESPONSES TO PHQ QUESTIONS 1-9: 0
2. FEELING DOWN, DEPRESSED OR HOPELESS: 0
1. LITTLE INTEREST OR PLEASURE IN DOING THINGS: 0

## 2023-01-30 NOTE — PROGRESS NOTES
[facility]  History and Physical  Andi Ware M.D.        Natasha Duncan  YOB: 1980    Date of Service:  1/30/2023    Chief Complaint:   Natasha Duncan is a 38 y.o. female who presents for   Chief Complaint   Patient presents with    Annual Exam       Assessment/Plan:    Edu Ferraro was seen today for annual exam.    Diagnoses and all orders for this visit:    Encounter for well adult exam without abnormal findings  -     CBC WITH DIFFERENTIAL/PLATELET; Future  -     Comprehensive Metabolic Panel; Future  -     Lipid Panel; Future    Need for prophylactic vaccination with tetanus-diphtheria (Td)  -     Tdap (age 6y and older) IM (Boostrix)    Anxiety  -     sertraline (ZOLOFT) 50 MG tablet; Take 1 tablet by mouth at bedtime      Return VV Anxiety 7/11. HPI: Here for Annual Physical and Follow up. Anxiety:  resolve on Zoloft 50 mg evening. No more anxiety with palpitations and shortness of breath. No more emotional and crying weekly and she does feel happy.      Lab Results   Component Value Date    LABA1C 5.2 02/05/2020     Lab Results   Component Value Date     02/07/2022    K 4.4 02/07/2022     02/07/2022    CO2 21 02/07/2022    BUN 15 02/07/2022    CREATININE 0.8 02/07/2022    GLUCOSE 92 02/07/2022    CALCIUM 10.5 02/07/2022     Lab Results   Component Value Date/Time    CHOL 188 02/07/2022 09:56 AM    TRIG 124 02/07/2022 09:56 AM    HDL 39 02/07/2022 09:56 AM    LDLCALC 124 02/07/2022 09:56 AM     Lab Results   Component Value Date    ALT 19 02/07/2022    AST 18 02/07/2022     No results found for: TSH, T4FREE, T3FREE  Lab Results   Component Value Date    WBC 10.2 02/07/2022    HGB 14.1 02/07/2022    HCT 43.2 02/07/2022    MCV 85.7 02/07/2022     02/07/2022     No results found for: INR   No results found for: PSA   No results found for: LABURIC     Wt Readings from Last 3 Encounters:   01/30/23 253 lb (114.8 kg)   02/07/22 253 lb (114.8 kg)   20 244 lb (110.7 kg)     BP Readings from Last 3 Encounters:   23 138/84   22 132/80   20 132/84       Patient Active Problem List   Diagnosis    Adnexal cyst    Menorrhagia       No Known Allergies  Outpatient Medications Marked as Taking for the 23 encounter (Office Visit) with José Antonio Armando MD   Medication Sig Dispense Refill    sertraline (ZOLOFT) 50 MG tablet Take 1 tablet by mouth at bedtime 90 tablet 0    albuterol sulfate HFA (VENTOLIN HFA) 108 (90 Base) MCG/ACT inhaler Inhale 2 puffs into the lungs 4 times daily as needed for Wheezing 18 g 0    Multiple Vitamins-Minerals (THERAPEUTIC MULTIVITAMIN-MINERALS) tablet Take 1 tablet by mouth daily         History reviewed. No pertinent past medical history. Past Surgical History:   Procedure Laterality Date     SECTION  , 2012    x 2    DILATION AND CURETTAGE OF UTERUS N/A 09/15/2016    DILATATION AND CURETTAGE, HYSTEROSCOPY,    HYSTERECTOMY, VAGINAL  2018    LAPAROSCOPY N/A 09/15/2016    WITH LEFT PARA-TUBAL CYSTECTOMY, AND LYSIS OF ADHESIONS.     LASIK      TYMPANOPLASTY  1993    ear drum age 10     Family History   Problem Relation Age of Onset    Breast Cancer Mother     Heart Disease Father     Stroke Father     Heart Attack Father 48    High Blood Pressure Father     Heart Attack Maternal Grandmother 61    Heart Disease Maternal Grandmother     High Blood Pressure Maternal Grandmother     Heart Attack Paternal Grandmother 61    Heart Disease Paternal Grandmother     High Blood Pressure Paternal Grandmother     Heart Attack Paternal Grandfather 79    Heart Disease Paternal Grandfather     High Blood Pressure Paternal Grandfather      Social History     Socioeconomic History    Marital status: Single     Spouse name: Not on file    Number of children: Not on file    Years of education: Not on file    Highest education level: Not on file   Occupational History    Occupation: surgical dental assistant Tobacco Use    Smoking status: Never    Smokeless tobacco: Never   Vaping Use    Vaping Use: Never used   Substance and Sexual Activity    Alcohol use: Yes     Comment: socially    Drug use: No    Sexual activity: Yes   Other Topics Concern    Not on file   Social History Narrative    Not on file     Social Determinants of Health     Financial Resource Strain: Low Risk     Difficulty of Paying Living Expenses: Not hard at all   Food Insecurity: No Food Insecurity    Worried About Running Out of Food in the Last Year: Never true    Ran Out of Food in the Last Year: Never true   Transportation Needs: Not on file   Physical Activity: Not on file   Stress: Not on file   Social Connections: Not on file   Intimate Partner Violence: Not on file   Housing Stability: Not on file       Review of Systems:  A comprehensive review of systems was negative except for what was noted in the HPI. Physical Exam:   Vitals:    01/30/23 1334   BP: 138/84   Pulse: 79   SpO2: 97%   Weight: 253 lb (114.8 kg)   Height: 5' 7.5\" (1.715 m)     Body mass index is 39.04 kg/m². Constitutional: She is oriented to person, place, and time. She appears well-developed and well-nourished. No distress. HEENT:   Head: Normocephalic and atraumatic. Right Ear: Tympanic membrane, external ear and ear canal normal.   Left Ear: Tympanic membrane, external ear and ear canal normal.   Mouth/Throat: Oropharynx is clear and moist, and mucous membranes are normal.  There is no cervical adenopathy. Eyes: Conjunctivae and extraocular motions are normal. Pupils are equal, round, and reactive to light. Neck: Supple. No JVD present. Carotid bruit is not present. No mass and no thyromegaly present. Cardiovascular: Normal rate, regular rhythm, normal heart sounds and intact distal pulses. Pulmonary/Chest: Effort normal and breath sounds normal. No respiratory distress. She has no wheezes, rhonchi or rales. Abdominal: Soft, non-tender.  Bowel sounds and aorta are normal. She exhibits no organomegaly, mass or bruit. Musculoskeletal: Normal range of motion, no synovitis. She exhibits no edema. Neurological: She is alert and oriented to person, place, and time. She has normal reflexes. No cranial nerve deficit. Coordination normal.   Skin: Skin is warm and dry. There is no rash or erythema. No suspicious lesions noted.        Preventive Care:  Health Maintenance   Topic Date Due    COVID-19 Vaccine (3 - Booster for Pfizer series) 04/20/2021    DTaP/Tdap/Td vaccine (2 - Td or Tdap) 02/23/2022    Flu vaccine (1) 08/01/2022    Depression Monitoring  02/07/2023    Breast cancer screen  02/08/2023    Lipids  02/07/2027    Hepatitis A vaccine  Aged Out    Hib vaccine  Aged Out    Meningococcal (ACWY) vaccine  Aged Out    Pneumococcal 0-64 years Vaccine  Aged Out    Varicella vaccine  Discontinued    Hepatitis C screen  Discontinued    HIV screen  Discontinued        Recommendations for Preventive Services Due: see orders and patient instructions/AVS.

## 2023-01-30 NOTE — PATIENT INSTRUCTIONS
Well Visit, Ages 25 to 72: Care Instructions  Well visits can help you stay healthy. Your doctor has checked your overall health and may have suggested ways to take good care of yourself. Your doctor also may have recommended tests. You can help prevent illness with healthy eating, good sleep, vaccinations, regular exercise, and other steps. Get the tests that you and your doctor decide on. Depending on your age and risks, examples might include screening for diabetes; hepatitis C; HIV; and cervical, breast, lung, and colon cancer. Screening helps find diseases before any symptoms appear. Eat healthy foods. Choose fruits, vegetables, whole grains, lean protein, and low-fat dairy foods. Limit saturated fat and reduce salt. Limit alcohol. Men should have no more than 2 drinks a day. Women should have no more than 1. For some people, no alcohol is the best choice. Exercise. Get at least 30 minutes of exercise on most days of the week. Walking can be a good choice. Reach and stay at your healthy weight. This will lower your risk for many health problems. Take care of your mental health. Try to stay connected with friends, family, and community, and find ways to manage stress. If you're feeling depressed or hopeless, talk to someone. A counselor can help. If you don't have a counselor, talk to your doctor. Talk to your doctor if you think you may have a problem with alcohol or drug use. This includes prescription medicines and illegal drugs. Avoid tobacco and nicotine: Don't smoke, vape, or chew. If you need help quitting, talk to your doctor. Practice safer sex. Getting tested, using condoms or dental dams, and limiting sex partners can help prevent STIs. Use birth control if it's important to you to prevent pregnancy. Talk with your doctor about your choices and what might be best for you. Prevent problems where you can.  Protect your skin from too much sun, wash your hands, brush your teeth twice a day, and wear a seat belt in the car. Where can you learn more? Go to http://www.davis.com/ and enter P072 to learn more about \"Well Visit, Ages 25 to 72: Care Instructions. \"  Current as of: March 9, 2022               Content Version: 13.5  © 0931-4437 [employer]. Care instructions adapted under license by [facility] ([facility]). If you have questions about a medical condition or this instruction, always ask your healthcare professional. [address] any warranty or liability for your use of this information.

## 2023-02-06 ENCOUNTER — HOSPITAL ENCOUNTER (OUTPATIENT)
Age: 43
Discharge: HOME | End: 2023-02-06
Payer: COMMERCIAL

## 2023-02-06 DIAGNOSIS — Z00.00 ENCOUNTER FOR WELL ADULT EXAM WITHOUT ABNORMAL FINDINGS: ICD-10-CM

## 2023-02-06 LAB
A/G RATIO: 1.4 (ref 1.1–2.2)
ALBUMIN SERPL-MCNC: 4.1 G/DL (ref 3.4–5)
ALP BLD-CCNC: 97 U/L (ref 40–129)
ALT SERPL-CCNC: 17 U/L (ref 10–40)
ANION GAP SERPL CALCULATED.3IONS-SCNC: 10 MMOL/L (ref 3–16)
AST SERPL-CCNC: 16 U/L (ref 15–37)
BASOPHILS ABSOLUTE: 0.1 K/UL (ref 0–0.2)
BASOPHILS RELATIVE PERCENT: 0.6 %
BILIRUB SERPL-MCNC: 0.3 MG/DL (ref 0–1)
BUN BLDV-MCNC: 12 MG/DL (ref 7–20)
CALCIUM SERPL-MCNC: 10.4 MG/DL (ref 8.3–10.6)
CHLORIDE BLD-SCNC: 105 MMOL/L (ref 99–110)
CHOLESTEROL, TOTAL: 184 MG/DL (ref 0–199)
CO2: 25 MMOL/L (ref 21–32)
CREAT SERPL-MCNC: 0.8 MG/DL (ref 0.6–1.1)
EOSINOPHILS ABSOLUTE: 0.2 K/UL (ref 0–0.6)
EOSINOPHILS RELATIVE PERCENT: 1.9 %
GFR SERPL CREATININE-BSD FRML MDRD: >60 ML/MIN/{1.73_M2}
GLUCOSE BLD-MCNC: 104 MG/DL (ref 70–99)
HCT VFR BLD CALC: 41.6 % (ref 36–48)
HDLC SERPL-MCNC: 31 MG/DL (ref 40–60)
HEMOGLOBIN: 13.9 G/DL (ref 12–16)
LDL CHOLESTEROL CALCULATED: 95 MG/DL
LYMPHOCYTES ABSOLUTE: 2.8 K/UL (ref 1–5.1)
LYMPHOCYTES RELATIVE PERCENT: 24.8 %
MCH RBC QN AUTO: 27.4 PG (ref 26–34)
MCHC RBC AUTO-ENTMCNC: 33.4 G/DL (ref 31–36)
MCV RBC AUTO: 82.1 FL (ref 80–100)
MONOCYTES ABSOLUTE: 0.6 K/UL (ref 0–1.3)
MONOCYTES RELATIVE PERCENT: 5.7 %
NEUTROPHILS ABSOLUTE: 7.5 K/UL (ref 1.7–7.7)
NEUTROPHILS RELATIVE PERCENT: 67 %
PDW BLD-RTO: 14 % (ref 12.4–15.4)
PLATELET # BLD: 166 K/UL (ref 135–450)
PMV BLD AUTO: 11.7 FL (ref 5–10.5)
POTASSIUM SERPL-SCNC: 4.2 MMOL/L (ref 3.5–5.1)
RBC # BLD: 5.06 M/UL (ref 4–5.2)
SODIUM BLD-SCNC: 140 MMOL/L (ref 136–145)
TOTAL PROTEIN: 7 G/DL (ref 6.4–8.2)
TRIGL SERPL-MCNC: 290 MG/DL (ref 0–150)
VLDLC SERPL CALC-MCNC: 58 MG/DL
WBC # BLD: 11.2 K/UL (ref 4–11)

## 2023-02-06 PROCEDURE — 85025 COMPLETE CBC W/AUTO DIFF WBC: CPT

## 2023-02-06 PROCEDURE — 80053 COMPREHEN METABOLIC PANEL: CPT

## 2023-02-06 PROCEDURE — 36415 COLL VENOUS BLD VENIPUNCTURE: CPT

## 2023-02-06 PROCEDURE — 80061 LIPID PANEL: CPT

## 2023-04-03 ENCOUNTER — OFFICE VISIT (OUTPATIENT)
Dept: INTERNAL MEDICINE CLINIC | Age: 43
End: 2023-04-03
Payer: COMMERCIAL

## 2023-04-03 VITALS
HEIGHT: 67 IN | BODY MASS INDEX: 40.18 KG/M2 | SYSTOLIC BLOOD PRESSURE: 136 MMHG | WEIGHT: 256 LBS | DIASTOLIC BLOOD PRESSURE: 8 MMHG | OXYGEN SATURATION: 97 % | TEMPERATURE: 96.9 F | RESPIRATION RATE: 16 BRPM | HEART RATE: 82 BPM

## 2023-04-03 DIAGNOSIS — J32.9 SINUSITIS, UNSPECIFIED CHRONICITY, UNSPECIFIED LOCATION: Primary | ICD-10-CM

## 2023-04-03 PROCEDURE — 99213 OFFICE O/P EST LOW 20 MIN: CPT | Performed by: INTERNAL MEDICINE

## 2023-04-03 RX ORDER — AZITHROMYCIN 250 MG/1
250 TABLET, FILM COATED ORAL SEE ADMIN INSTRUCTIONS
Qty: 6 TABLET | Refills: 0 | Status: SHIPPED | OUTPATIENT
Start: 2023-04-03 | End: 2023-04-08

## 2023-04-03 SDOH — ECONOMIC STABILITY: FOOD INSECURITY: WITHIN THE PAST 12 MONTHS, THE FOOD YOU BOUGHT JUST DIDN'T LAST AND YOU DIDN'T HAVE MONEY TO GET MORE.: NEVER TRUE

## 2023-04-03 SDOH — ECONOMIC STABILITY: INCOME INSECURITY: HOW HARD IS IT FOR YOU TO PAY FOR THE VERY BASICS LIKE FOOD, HOUSING, MEDICAL CARE, AND HEATING?: NOT HARD AT ALL

## 2023-04-03 SDOH — ECONOMIC STABILITY: HOUSING INSECURITY
IN THE LAST 12 MONTHS, WAS THERE A TIME WHEN YOU DID NOT HAVE A STEADY PLACE TO SLEEP OR SLEPT IN A SHELTER (INCLUDING NOW)?: NO

## 2023-04-03 SDOH — ECONOMIC STABILITY: FOOD INSECURITY: WITHIN THE PAST 12 MONTHS, YOU WORRIED THAT YOUR FOOD WOULD RUN OUT BEFORE YOU GOT MONEY TO BUY MORE.: NEVER TRUE

## 2023-04-03 NOTE — PROGRESS NOTES
4/14/2023] sertraline (ZOLOFT) 50 MG tablet Take 1 tablet by mouth at bedtime 90 tablet 0    albuterol sulfate HFA (VENTOLIN HFA) 108 (90 Base) MCG/ACT inhaler Inhale 2 puffs into the lungs 4 times daily as needed for Wheezing 18 g 0    Multiple Vitamins-Minerals (THERAPEUTIC MULTIVITAMIN-MINERALS) tablet Take 1 tablet by mouth daily           Review of Systems: 14 systems were negative except of what was stated on HPI    Nursing note and vitals reviewed. Vitals:    04/03/23 1145   BP: (!) 136/8   Pulse: 82   Resp: 16   Temp: 96.9 °F (36.1 °C)   TempSrc: Temporal   SpO2: 97%   Weight: 256 lb (116.1 kg)   Height: 5' 7\" (1.702 m)     Wt Readings from Last 3 Encounters:   04/03/23 256 lb (116.1 kg)   01/30/23 253 lb (114.8 kg)   02/07/22 253 lb (114.8 kg)     BP Readings from Last 3 Encounters:   04/03/23 (!) 136/8   01/30/23 138/84   02/07/22 132/80     Body mass index is 40.1 kg/m². Constitutional: Patient appears well-developed and well-nourished. No distress. Head: Normocephalic and atraumatic. Neck: Normal range of motion. Neck supple. No thyroidmegaly. Cardiovascular: Normal rate, regular rhythm, normal heart sounds and intact distal pulses. Pulmonary/Chest: Effort normal and breath sounds normal. No stridor. No respiratory distress. No wheezes and no rales. Abdominal: Soft. Bowel sounds are normal. No distension and no mass. No tenderness. No rebound and no guarding. Musculoskeletal: No edema and no tenderness. Skin: No rash or erythema.

## 2023-05-11 DIAGNOSIS — F41.9 ANXIETY: ICD-10-CM

## 2023-05-17 LAB — MAMMOGRAPHY, EXTERNAL: NORMAL

## 2023-06-12 DIAGNOSIS — F41.9 ANXIETY: ICD-10-CM

## 2023-06-19 DIAGNOSIS — F41.9 ANXIETY: ICD-10-CM

## 2023-07-04 DIAGNOSIS — F41.9 ANXIETY: ICD-10-CM

## 2023-07-11 ENCOUNTER — TELEMEDICINE (OUTPATIENT)
Dept: INTERNAL MEDICINE CLINIC | Age: 43
End: 2023-07-11
Payer: COMMERCIAL

## 2023-07-11 DIAGNOSIS — F41.9 ANXIETY: ICD-10-CM

## 2023-07-11 PROCEDURE — 99213 OFFICE O/P EST LOW 20 MIN: CPT | Performed by: INTERNAL MEDICINE

## 2023-07-11 NOTE — PROGRESS NOTES
Patient was evaluated through a synchronous (real-time) video encounter. Patient identification was verified at the start of the visit. She (or guardian if applicable) is aware that this is a billable service, which includes applicable co-pays. This visit was conducted with the patient's (and/or legal guardian's) verbal consent. She has not had a related appointment within my department in the past 7 days or scheduled within the next 24 hours. The patient was located at Home: [address] [place] [address].[address]. The provider was located at Home ([address] [place] [address]): [place]. Date of Service:  7/11/2023    Chief Complaint:      Chief Complaint   Patient presents with    Anxiety       Assessment/Plan:    Sonia Friend was seen today for anxiety. Diagnoses and all orders for this visit:    Anxiety  -     sertraline (ZOLOFT) 50 MG tablet; Take 1 tablet by mouth at bedtime    Stable and continue on current medications. Return Fasting PE 1/22. HPI:  Adonis James is a 41 y.o. Anxiety:  resolve on Zoloft 50 mg evening. No more anxiety with palpitations and shortness of breath. No more emotional and crying weekly and she does feel happy.      Lab Results   Component Value Date    LABA1C 5.2 02/05/2020     Lab Results   Component Value Date     02/06/2023    K 4.2 02/06/2023     02/06/2023    CO2 25 02/06/2023    BUN 12 02/06/2023    CREATININE 0.8 02/06/2023    GLUCOSE 104 (H) 02/06/2023    CALCIUM 10.4 02/06/2023     Lab Results   Component Value Date/Time    CHOL 184 02/06/2023 07:50 AM    TRIG 290 02/06/2023 07:50 AM    HDL 31 02/06/2023 07:50 AM    LDLCALC 95 02/06/2023 07:50 AM     Lab Results   Component Value Date    ALT 17 02/06/2023    AST 16 02/06/2023     No results found for: TSH, T4FREE, T3FREE  Lab Results   Component Value Date    WBC 11.2 (H) 02/06/2023    HGB 13.9 02/06/2023    HCT 41.6 02/06/2023    MCV 82.1 02/06/2023     02/06/2023     No results found

## 2024-01-08 DIAGNOSIS — F41.9 ANXIETY: ICD-10-CM

## 2024-02-05 ENCOUNTER — OFFICE VISIT (OUTPATIENT)
Dept: INTERNAL MEDICINE CLINIC | Age: 44
End: 2024-02-05
Payer: COMMERCIAL

## 2024-02-05 VITALS
DIASTOLIC BLOOD PRESSURE: 80 MMHG | OXYGEN SATURATION: 97 % | BODY MASS INDEX: 41.5 KG/M2 | SYSTOLIC BLOOD PRESSURE: 132 MMHG | TEMPERATURE: 98.1 F | HEART RATE: 90 BPM | WEIGHT: 265 LBS

## 2024-02-05 DIAGNOSIS — J45.20 MILD INTERMITTENT ASTHMA WITHOUT COMPLICATION: ICD-10-CM

## 2024-02-05 DIAGNOSIS — J06.9 URI WITH COUGH AND CONGESTION: Primary | ICD-10-CM

## 2024-02-05 PROCEDURE — 99213 OFFICE O/P EST LOW 20 MIN: CPT | Performed by: INTERNAL MEDICINE

## 2024-02-05 RX ORDER — MONTELUKAST SODIUM 10 MG/1
10 TABLET ORAL DAILY
Qty: 30 TABLET | Refills: 0 | Status: SHIPPED | OUTPATIENT
Start: 2024-02-05

## 2024-02-05 RX ORDER — AZITHROMYCIN 250 MG/1
250 TABLET, FILM COATED ORAL SEE ADMIN INSTRUCTIONS
Qty: 6 TABLET | Refills: 0 | Status: SHIPPED | OUTPATIENT
Start: 2024-02-05 | End: 2024-02-10

## 2024-02-05 RX ORDER — TAMOXIFEN CITRATE 10 MG/1
10 TABLET ORAL
COMMUNITY
Start: 2023-09-08

## 2024-02-05 NOTE — PROGRESS NOTES
Karina Jaramillo  YOB: 1980    Date of Service:  2/5/2024    Chief Complaint:      Chief Complaint   Patient presents with    Cough     Onset about 5 days, no other sx's- (no Covid testing)    Congestion     Not really productive cough, but chest feels congesed       Assessment/Plan:  Karina was seen today for cough and congestion.    Diagnoses and all orders for this visit:    URI with cough and congestion  -     azithromycin (ZITHROMAX) 250 MG tablet; Take 1 tablet by mouth See Admin Instructions for 5 days 500mg on day 1 followed by 250mg on days 2 - 5  -     montelukast (SINGULAIR) 10 MG tablet; Take 1 tablet by mouth daily    Mild intermittent asthma without complication  Start  montelukast (SINGULAIR) 10 MG tablet; Take 1 tablet by mouth daily    If you're still having congestion, buy some over the counter Mucinex 1200 mg or Mucinex DM 1200 mg (if coughing) 1 pill twice a day  to thin up your secretions so it can drain to relieve pressure in your face/ears.    Return keep 2/19 f/u.      HPI:  Karina Jaramillo is a 43 y.o.    She complain of coughing up light yellow phlegm for a few days.  No fever or chills.  Robitussin as needed.    Lab Results   Component Value Date    LABA1C 5.2 02/05/2020     Lab Results   Component Value Date     02/06/2023    K 4.2 02/06/2023     02/06/2023    CO2 25 02/06/2023    BUN 12 02/06/2023    CREATININE 0.8 02/06/2023    GLUCOSE 104 (H) 02/06/2023    CALCIUM 10.4 02/06/2023     Lab Results   Component Value Date/Time    CHOL 184 02/06/2023 07:50 AM    TRIG 290 02/06/2023 07:50 AM    HDL 31 02/06/2023 07:50 AM    LDLCALC 95 02/06/2023 07:50 AM     Lab Results   Component Value Date    ALT 17 02/06/2023    AST 16 02/06/2023     No results found for: \"TSH\", \"T4FREE\", \"T3FREE\"  Lab Results   Component Value Date    WBC 11.2 (H) 02/06/2023    HGB 13.9 02/06/2023    HCT 41.6 02/06/2023    MCV 82.1 02/06/2023     02/06/2023     No results

## 2024-02-07 DIAGNOSIS — F41.9 ANXIETY: ICD-10-CM

## 2024-02-13 DIAGNOSIS — J06.9 URI WITH COUGH AND CONGESTION: ICD-10-CM

## 2024-02-13 RX ORDER — ALBUTEROL SULFATE 90 UG/1
2 AEROSOL, METERED RESPIRATORY (INHALATION) 4 TIMES DAILY PRN
Qty: 18 G | Refills: 1 | Status: SHIPPED | OUTPATIENT
Start: 2024-02-13

## 2024-02-13 NOTE — TELEPHONE ENCOUNTER
Pt stated the cough is still lingering and she feels the inhaler is still working. Pt does have upcoming appointment, but feels the medicine would help

## 2024-02-13 NOTE — TELEPHONE ENCOUNTER
CAROL ANN for patient. Is she still having the cough that she was seen for 2-5?, have symptoms worsened.  will want to know before refilling, unless discussed during visit.    LAST REFILL 11-  AMOUNT 1     0 REFILLS  LAST VISIT 2-5-2024  NEXT VISIT 2-19-24

## 2024-02-19 ENCOUNTER — OFFICE VISIT (OUTPATIENT)
Dept: INTERNAL MEDICINE CLINIC | Age: 44
End: 2024-02-19
Payer: COMMERCIAL

## 2024-02-19 VITALS
WEIGHT: 263 LBS | DIASTOLIC BLOOD PRESSURE: 90 MMHG | BODY MASS INDEX: 41.28 KG/M2 | SYSTOLIC BLOOD PRESSURE: 152 MMHG | OXYGEN SATURATION: 97 % | HEART RATE: 92 BPM | HEIGHT: 67 IN

## 2024-02-19 DIAGNOSIS — E66.01 CLASS 3 SEVERE OBESITY DUE TO EXCESS CALORIES WITHOUT SERIOUS COMORBIDITY WITH BODY MASS INDEX (BMI) OF 40.0 TO 44.9 IN ADULT (HCC): ICD-10-CM

## 2024-02-19 DIAGNOSIS — F41.9 ANXIETY: ICD-10-CM

## 2024-02-19 DIAGNOSIS — E78.1 HYPERTRIGLYCERIDEMIA: ICD-10-CM

## 2024-02-19 DIAGNOSIS — Z23 NEED FOR INFLUENZA VACCINATION: ICD-10-CM

## 2024-02-19 DIAGNOSIS — Z00.00 ENCOUNTER FOR WELL ADULT EXAM WITHOUT ABNORMAL FINDINGS: Primary | ICD-10-CM

## 2024-02-19 PROBLEM — E66.813 CLASS 3 SEVERE OBESITY DUE TO EXCESS CALORIES WITHOUT SERIOUS COMORBIDITY WITH BODY MASS INDEX (BMI) OF 40.0 TO 44.9 IN ADULT: Status: ACTIVE | Noted: 2024-02-19

## 2024-02-19 PROCEDURE — 36415 COLL VENOUS BLD VENIPUNCTURE: CPT | Performed by: INTERNAL MEDICINE

## 2024-02-19 PROCEDURE — 90674 CCIIV4 VAC NO PRSV 0.5 ML IM: CPT | Performed by: INTERNAL MEDICINE

## 2024-02-19 PROCEDURE — 90471 IMMUNIZATION ADMIN: CPT | Performed by: INTERNAL MEDICINE

## 2024-02-19 PROCEDURE — 99396 PREV VISIT EST AGE 40-64: CPT | Performed by: INTERNAL MEDICINE

## 2024-02-19 ASSESSMENT — PATIENT HEALTH QUESTIONNAIRE - PHQ9
7. TROUBLE CONCENTRATING ON THINGS, SUCH AS READING THE NEWSPAPER OR WATCHING TELEVISION: 0
SUM OF ALL RESPONSES TO PHQ QUESTIONS 1-9: 0
9. THOUGHTS THAT YOU WOULD BE BETTER OFF DEAD, OR OF HURTING YOURSELF: 0
5. POOR APPETITE OR OVEREATING: 0
10. IF YOU CHECKED OFF ANY PROBLEMS, HOW DIFFICULT HAVE THESE PROBLEMS MADE IT FOR YOU TO DO YOUR WORK, TAKE CARE OF THINGS AT HOME, OR GET ALONG WITH OTHER PEOPLE: 0
SUM OF ALL RESPONSES TO PHQ QUESTIONS 1-9: 0
2. FEELING DOWN, DEPRESSED OR HOPELESS: 0
1. LITTLE INTEREST OR PLEASURE IN DOING THINGS: 0
6. FEELING BAD ABOUT YOURSELF - OR THAT YOU ARE A FAILURE OR HAVE LET YOURSELF OR YOUR FAMILY DOWN: 0
SUM OF ALL RESPONSES TO PHQ9 QUESTIONS 1 & 2: 0
4. FEELING TIRED OR HAVING LITTLE ENERGY: 0
8. MOVING OR SPEAKING SO SLOWLY THAT OTHER PEOPLE COULD HAVE NOTICED. OR THE OPPOSITE, BEING SO FIGETY OR RESTLESS THAT YOU HAVE BEEN MOVING AROUND A LOT MORE THAN USUAL: 0
3. TROUBLE FALLING OR STAYING ASLEEP: 0
SUM OF ALL RESPONSES TO PHQ QUESTIONS 1-9: 0
SUM OF ALL RESPONSES TO PHQ QUESTIONS 1-9: 0

## 2024-02-19 NOTE — PROGRESS NOTES
index is 41.19 kg/m².   Constitutional: She is oriented to person, place, and time. She appears well-developed and well-nourished. No distress.   HEENT:   Head: Normocephalic and atraumatic.   Right Ear: Tympanic membrane, external ear and ear canal normal.   Left Ear: Tympanic membrane, external ear and ear canal normal.   Mouth/Throat: Oropharynx is clear and moist, and mucous membranes are normal.  There is no cervical adenopathy.  Eyes: Conjunctivae and extraocular motions are normal. Pupils are equal, round, and reactive to light.   Neck: Supple. No JVD present. Carotid bruit is not present. No mass and no thyromegaly present.   Cardiovascular: Normal rate, regular rhythm, normal heart sounds and intact distal pulses.    Pulmonary/Chest: Effort normal and breath sounds normal. No respiratory distress. She has no wheezes, rhonchi or rales.   Abdominal: Soft, non-tender. Bowel sounds and aorta are normal. She exhibits no organomegaly, mass or bruit.   Musculoskeletal: Normal range of motion, no synovitis. She exhibits no edema.   Neurological: She is alert and oriented to person, place, and time. She has normal reflexes. No cranial nerve deficit. Coordination normal.   Skin: Skin is warm and dry. There is no rash or erythema.  No suspicious lesions noted.       Preventive Care:  Health Maintenance   Topic Date Due    Diabetes screen  02/05/2023    Flu vaccine (1) 08/01/2023    COVID-19 Vaccine (3 - 2023-24 season) 09/01/2023    Depression Monitoring  01/30/2024    Hepatitis B vaccine (1 of 3 - 3-dose series) 02/19/2050 (Originally 1980)    Breast cancer screen  05/17/2024    Lipids  02/06/2028    DTaP/Tdap/Td vaccine (3 - Td or Tdap) 01/30/2033    Hepatitis A vaccine  Aged Out    Hib vaccine  Aged Out    HPV vaccine  Aged Out    Polio vaccine  Aged Out    Meningococcal (ACWY) vaccine  Aged Out    Pneumococcal 0-64 years Vaccine  Aged Out    Varicella vaccine  Discontinued    Depression Screen  Discontinued

## 2024-02-19 NOTE — PATIENT INSTRUCTIONS
you make healthy changes in your diet.  An exercise specialist or  can help you develop a safe and effective exercise program.  A counselor or psychiatrist can help you cope with issues such as depression, anxiety, or family problems that can make it hard to focus on weight loss.  Consider joining a support group for people who are trying to lose weight. Your doctor can suggest groups in your area.  Where can you learn more?  Go to https://www.Streemio.net/patientEd and enter U357 to learn more about \"Starting a Weight Loss Plan: Care Instructions.\"  Current as of: September 20, 2023               Content Version: 13.9  © 8384-3163 [employer].   Care instructions adapted under license by [employer] If you have questions about a medical condition or this instruction, always ask your healthcare professional. [facility] disclaims any warranty or liability for your use of this information.           Well Visit, Ages 18 to 65: Care Instructions  Well visits can help you stay healthy. Your doctor has checked your overall health and may have suggested ways to take good care of yourself. Your doctor also may have recommended tests. You can help prevent illness with healthy eating, good sleep, vaccinations, regular exercise, and other steps.    Get the tests that you and your doctor decide on. Depending on your age and risks, examples might include screening for diabetes; hepatitis C; HIV; and cervical, breast, lung, and colon cancer. Screening helps find diseases before any symptoms appear.   Eat healthy foods. Choose fruits, vegetables, whole grains, lean protein, and low-fat dairy foods. Limit saturated fat and reduce salt.     Limit alcohol. Men should have no more than 2 drinks a day. Women should have no more than 1. For some people, no alcohol is the best choice.   Exercise. Get at least 30 minutes of exercise on most days of the week. Walking can be a good choice.

## 2024-02-20 LAB
ALBUMIN SERPL-MCNC: 4.2 G/DL (ref 3.4–5)
ALBUMIN/GLOB SERPL: 1.4 {RATIO} (ref 1.1–2.2)
ALP SERPL-CCNC: 94 U/L (ref 40–129)
ALT SERPL-CCNC: 14 U/L (ref 10–40)
ANION GAP SERPL CALCULATED.3IONS-SCNC: 11 MMOL/L (ref 3–16)
AST SERPL-CCNC: 13 U/L (ref 15–37)
BASOPHILS # BLD: 0.1 K/UL (ref 0–0.2)
BASOPHILS NFR BLD: 0.8 %
BILIRUB SERPL-MCNC: <0.2 MG/DL (ref 0–1)
BUN SERPL-MCNC: 10 MG/DL (ref 7–20)
CALCIUM SERPL-MCNC: 10 MG/DL (ref 8.3–10.6)
CHLORIDE SERPL-SCNC: 105 MMOL/L (ref 99–110)
CHOLEST SERPL-MCNC: 184 MG/DL (ref 0–199)
CO2 SERPL-SCNC: 24 MMOL/L (ref 21–32)
CREAT SERPL-MCNC: 0.9 MG/DL (ref 0.6–1.1)
DEPRECATED RDW RBC AUTO: 14.6 % (ref 12.4–15.4)
EOSINOPHIL # BLD: 0.2 K/UL (ref 0–0.6)
EOSINOPHIL NFR BLD: 1.2 %
GFR SERPLBLD CREATININE-BSD FMLA CKD-EPI: >60 ML/MIN/{1.73_M2}
GLUCOSE SERPL-MCNC: 110 MG/DL (ref 70–99)
HCT VFR BLD AUTO: 40.8 % (ref 36–48)
HDLC SERPL-MCNC: 31 MG/DL (ref 40–60)
HGB BLD-MCNC: 13.5 G/DL (ref 12–16)
LDLC SERPL CALC-MCNC: ABNORMAL MG/DL
LDLC SERPL-MCNC: 76 MG/DL
LYMPHOCYTES # BLD: 2.9 K/UL (ref 1–5.1)
LYMPHOCYTES NFR BLD: 23.2 %
MCH RBC QN AUTO: 27.8 PG (ref 26–34)
MCHC RBC AUTO-ENTMCNC: 33 G/DL (ref 31–36)
MCV RBC AUTO: 84 FL (ref 80–100)
MONOCYTES # BLD: 0.6 K/UL (ref 0–1.3)
MONOCYTES NFR BLD: 4.9 %
NEUTROPHILS # BLD: 8.9 K/UL (ref 1.7–7.7)
NEUTROPHILS NFR BLD: 69.9 %
PLATELET # BLD AUTO: 184 K/UL (ref 135–450)
PMV BLD AUTO: 12.3 FL (ref 5–10.5)
POTASSIUM SERPL-SCNC: 4.4 MMOL/L (ref 3.5–5.1)
PROT SERPL-MCNC: 7.1 G/DL (ref 6.4–8.2)
RBC # BLD AUTO: 4.85 M/UL (ref 4–5.2)
SODIUM SERPL-SCNC: 140 MMOL/L (ref 136–145)
TRIGL SERPL-MCNC: 360 MG/DL (ref 0–150)
VLDLC SERPL CALC-MCNC: ABNORMAL MG/DL
WBC # BLD AUTO: 12.7 K/UL (ref 4–11)

## 2024-02-29 DIAGNOSIS — J06.9 URI WITH COUGH AND CONGESTION: ICD-10-CM

## 2024-02-29 DIAGNOSIS — J45.20 MILD INTERMITTENT ASTHMA WITHOUT COMPLICATION: ICD-10-CM

## 2024-02-29 NOTE — TELEPHONE ENCOUNTER
Pharmacy asking for a 90 day Rx  LAST REFILL 2-5-24  AMOUNT 30     0 REFILLS  LAST VISIT 2-19-24  NEXT VISIT 8-20-24

## 2024-03-04 RX ORDER — MONTELUKAST SODIUM 10 MG/1
10 TABLET ORAL DAILY
Qty: 90 TABLET | Refills: 0 | Status: SHIPPED | OUTPATIENT
Start: 2024-03-04

## 2024-03-04 NOTE — TELEPHONE ENCOUNTER
Patient states that this medication does help her with cough at night. She asks for a 90 day supply to be sent in.

## 2024-04-29 ENCOUNTER — OFFICE VISIT (OUTPATIENT)
Dept: INTERNAL MEDICINE CLINIC | Age: 44
End: 2024-04-29
Payer: COMMERCIAL

## 2024-04-29 VITALS
HEART RATE: 104 BPM | BODY MASS INDEX: 41.75 KG/M2 | SYSTOLIC BLOOD PRESSURE: 128 MMHG | DIASTOLIC BLOOD PRESSURE: 70 MMHG | OXYGEN SATURATION: 98 % | WEIGHT: 266 LBS | HEIGHT: 67 IN

## 2024-04-29 DIAGNOSIS — E66.01 CLASS 3 SEVERE OBESITY DUE TO EXCESS CALORIES WITHOUT SERIOUS COMORBIDITY WITH BODY MASS INDEX (BMI) OF 40.0 TO 44.9 IN ADULT (HCC): ICD-10-CM

## 2024-04-29 DIAGNOSIS — J06.9 URI WITH COUGH AND CONGESTION: Primary | ICD-10-CM

## 2024-04-29 PROCEDURE — 99213 OFFICE O/P EST LOW 20 MIN: CPT | Performed by: INTERNAL MEDICINE

## 2024-04-29 RX ORDER — TIRZEPATIDE 2.5 MG/.5ML
2.5 INJECTION, SOLUTION SUBCUTANEOUS WEEKLY
Qty: 4 ML | Refills: 0 | Status: SHIPPED | OUTPATIENT
Start: 2024-04-29

## 2024-04-29 RX ORDER — AZITHROMYCIN 250 MG/1
TABLET, FILM COATED ORAL
Qty: 6 TABLET | Refills: 0 | Status: SHIPPED | OUTPATIENT
Start: 2024-04-29 | End: 2024-05-09

## 2024-04-29 SDOH — ECONOMIC STABILITY: INCOME INSECURITY: HOW HARD IS IT FOR YOU TO PAY FOR THE VERY BASICS LIKE FOOD, HOUSING, MEDICAL CARE, AND HEATING?: NOT HARD AT ALL

## 2024-04-29 SDOH — ECONOMIC STABILITY: FOOD INSECURITY: WITHIN THE PAST 12 MONTHS, YOU WORRIED THAT YOUR FOOD WOULD RUN OUT BEFORE YOU GOT MONEY TO BUY MORE.: NEVER TRUE

## 2024-04-29 SDOH — ECONOMIC STABILITY: FOOD INSECURITY: WITHIN THE PAST 12 MONTHS, THE FOOD YOU BOUGHT JUST DIDN'T LAST AND YOU DIDN'T HAVE MONEY TO GET MORE.: NEVER TRUE

## 2024-04-29 NOTE — PROGRESS NOTES
Karina Jaramillo  YOB: 1980    Date of Service:  4/29/2024    Chief Complaint:      Chief Complaint   Patient presents with    Cough     X 1 week       Weight Management     Discuss weight loss medication          Assessment/Plan:  Karina was seen today for cough and weight management.    Diagnoses and all orders for this visit:    URI with cough and congestion  -     azithromycin (ZITHROMAX) 250 MG tablet; 500mg on day 1 followed by 250mg on days 2 - 5  If you're still having congestion, buy some over the counter Mucinex 1200 mg or Mucinex DM 1200 mg (if coughing) 1 pill twice a day  to thin up your secretions so it can drain to relieve pressure in your face/ears.    Class 3 severe obesity due to excess calories without serious comorbidity with body mass index (BMI) of 40.0 to 44.9 in adult (HCC)  Start Tirzepatide (MOUNJARO) 2.5 MG/0.5ML SOPN SC injection; Inject 0.5 mLs into the skin once a week      Return VV Weight management on Mounjaro 6/3.      HPI:  Karina Jaramillo is a 43 y.o.    She complain of increase cough for the past week and now phlegm is yellowish since yesterday.  No myalgia, fever or chills.  Taking claritin     Lab Results   Component Value Date    LABA1C 5.2 02/05/2020     Lab Results   Component Value Date     02/19/2024    K 4.4 02/19/2024     02/19/2024    CO2 24 02/19/2024    BUN 10 02/19/2024    CREATININE 0.9 02/19/2024    GLUCOSE 110 (H) 02/19/2024    CALCIUM 10.0 02/19/2024     Lab Results   Component Value Date/Time    CHOL 184 02/19/2024 08:49 AM    TRIG 360 02/19/2024 08:49 AM    HDL 31 02/19/2024 08:49 AM    LDLCALC see below 02/19/2024 08:49 AM    LDLDIRECT 76 02/19/2024 08:49 AM     Lab Results   Component Value Date    ALT 14 02/19/2024    AST 13 (L) 02/19/2024     No results found for: \"TSH\", \"T4FREE\", \"T3FREE\"  Lab Results   Component Value Date    WBC 12.7 (H) 02/19/2024    HGB 13.5 02/19/2024    HCT 40.8 02/19/2024    MCV 84.0

## 2024-05-23 DIAGNOSIS — E66.01 CLASS 3 SEVERE OBESITY DUE TO EXCESS CALORIES WITHOUT SERIOUS COMORBIDITY WITH BODY MASS INDEX (BMI) OF 40.0 TO 44.9 IN ADULT (HCC): ICD-10-CM

## 2024-05-23 RX ORDER — TIRZEPATIDE 2.5 MG/.5ML
2.5 INJECTION, SOLUTION SUBCUTANEOUS WEEKLY
Qty: 4 ML | Refills: 0 | OUTPATIENT
Start: 2024-05-23

## 2024-05-28 ENCOUNTER — TELEMEDICINE (OUTPATIENT)
Dept: INTERNAL MEDICINE CLINIC | Age: 44
End: 2024-05-28
Payer: COMMERCIAL

## 2024-05-28 DIAGNOSIS — E66.01 CLASS 3 SEVERE OBESITY DUE TO EXCESS CALORIES WITHOUT SERIOUS COMORBIDITY WITH BODY MASS INDEX (BMI) OF 40.0 TO 44.9 IN ADULT (HCC): ICD-10-CM

## 2024-05-28 PROCEDURE — 99213 OFFICE O/P EST LOW 20 MIN: CPT | Performed by: INTERNAL MEDICINE

## 2024-05-28 RX ORDER — TIRZEPATIDE 2.5 MG/.5ML
2.5 INJECTION, SOLUTION SUBCUTANEOUS WEEKLY
Qty: 4 ML | Refills: 1 | Status: SHIPPED | OUTPATIENT
Start: 2024-05-28

## 2024-05-28 NOTE — PROGRESS NOTES
Patient was evaluated through a synchronous (real-time) video encounter. Patient identification was verified at the start of the visit. She (or guardian if applicable) is aware that this is a billable service, which includes applicable co-pays. This visit was conducted with the patient's (and/or legal guardian's) verbal consent. She has not had a related appointment within my department in the past 7 days or scheduled within the next 24 hours.   The patient was located at Other: Car in [place] .  The provider was located at Home (Appt Dept State): [place].    Date of Service:  5/28/2024    Chief Complaint:      Chief Complaint   Patient presents with    Weight Management     Doing well on current meds.       Assessment/Plan:    Karina was seen today for weight management.    Diagnoses and all orders for this visit:    Class 3 severe obesity due to excess calories without serious comorbidity with body mass index (BMI) of 40.0 to 44.9 in adult (HCC)  -     Tirzepatide (MOUNJARO) 2.5 MG/0.5ML SOPN SC injection; Inject 0.5 mLs into the skin once a week      Return VV Weight Management and Anxiety 7/18.      HPI:  Karina Jaramillo is a 43 y.o.      She lost 8 # since started on medication in April.  She weights 256 # currently on Mounjaro 2.5 mg qweek.  No side effects, constipation or nausea.  Not as much craving or hungry anymore.    Lab Results   Component Value Date    LABA1C 5.2 02/05/2020     Lab Results   Component Value Date     02/19/2024    K 4.4 02/19/2024     02/19/2024    CO2 24 02/19/2024    BUN 10 02/19/2024    CREATININE 0.9 02/19/2024    GLUCOSE 110 (H) 02/19/2024    CALCIUM 10.0 02/19/2024     Lab Results   Component Value Date/Time    CHOL 184 02/19/2024 08:49 AM    TRIG 360 02/19/2024 08:49 AM    HDL 31 02/19/2024 08:49 AM    LDLDIRECT 76 02/19/2024 08:49 AM     Lab Results   Component Value Date    ALT 14 02/19/2024    AST 13 (L) 02/19/2024     Lab Results   Component Value Date    TSH

## 2024-07-18 ENCOUNTER — TELEMEDICINE (OUTPATIENT)
Dept: INTERNAL MEDICINE CLINIC | Age: 44
End: 2024-07-18
Payer: COMMERCIAL

## 2024-07-18 VITALS — HEIGHT: 67 IN | BODY MASS INDEX: 37.98 KG/M2 | WEIGHT: 242 LBS

## 2024-07-18 DIAGNOSIS — J45.20 MILD INTERMITTENT ASTHMA WITHOUT COMPLICATION: ICD-10-CM

## 2024-07-18 DIAGNOSIS — E66.01 CLASS 3 SEVERE OBESITY DUE TO EXCESS CALORIES WITHOUT SERIOUS COMORBIDITY WITH BODY MASS INDEX (BMI) OF 40.0 TO 44.9 IN ADULT (HCC): ICD-10-CM

## 2024-07-18 DIAGNOSIS — F41.9 ANXIETY: ICD-10-CM

## 2024-07-18 PROCEDURE — 99213 OFFICE O/P EST LOW 20 MIN: CPT | Performed by: INTERNAL MEDICINE

## 2024-07-18 RX ORDER — MONTELUKAST SODIUM 10 MG/1
10 TABLET ORAL DAILY
Qty: 90 TABLET | Refills: 1 | Status: SHIPPED | OUTPATIENT
Start: 2024-07-18 | End: 2024-07-18 | Stop reason: ALTCHOICE

## 2024-07-18 RX ORDER — TIRZEPATIDE 2.5 MG/.5ML
2.5 INJECTION, SOLUTION SUBCUTANEOUS WEEKLY
Qty: 4 ML | Refills: 1 | Status: SHIPPED | OUTPATIENT
Start: 2024-07-18

## 2024-07-18 NOTE — PROGRESS NOTES
Patient was evaluated through a synchronous (real-time) video encounter. Patient identification was verified at the start of the visit. She (or guardian if applicable) is aware that this is a billable service, which includes applicable co-pays. This visit was conducted with the patient's (and/or legal guardian's) verbal consent. She has not had a related appointment within my department in the past 7 days or scheduled within the next 24 hours.   The patient was located at Home: [address] [place] [id number].  The provider was located at Home (Appt Dept State): [place].    Date of Service:  7/18/2024    Chief Complaint:      Chief Complaint   Patient presents with    Weight Management       Assessment/Plan:    There are no diagnoses linked to this encounter.  Stable and continue on current medications.    No follow-ups on file.      HPI:  Karina Jaramillo is a 44 y.o.      She lost  23 # since started on Mounjaro 2.5 mg qweek in April. She weights 241 # currently (baseline weight 264#). No side effects, constipation or nausea.  Not as much craving or hungry anymore.     Anxiety:  resolve on Zoloft 50 mg evening.  No more anxiety with palpitations and shortness of breath.  No more emotional and crying weekly and she does feel happy.    Mild Asthma:  stable on Singulair 10 mg evening and will try to get off    Lab Results   Component Value Date    LABA1C 5.2 02/05/2020     Lab Results   Component Value Date     02/19/2024    K 4.4 02/19/2024     02/19/2024    CO2 24 02/19/2024    BUN 10 02/19/2024    CREATININE 0.9 02/19/2024    GLUCOSE 110 (H) 02/19/2024    CALCIUM 10.0 02/19/2024     Lab Results   Component Value Date/Time    CHOL 184 02/19/2024 08:49 AM    TRIG 360 02/19/2024 08:49 AM    HDL 31 02/19/2024 08:49 AM    LDLDIRECT 76 02/19/2024 08:49 AM     Lab Results   Component Value Date    ALT 14 02/19/2024    AST 13 (L) 02/19/2024     Lab Results   Component Value Date    TSH 1.60

## 2024-09-23 ENCOUNTER — OFFICE VISIT (OUTPATIENT)
Dept: INTERNAL MEDICINE CLINIC | Age: 44
End: 2024-09-23
Payer: COMMERCIAL

## 2024-09-23 VITALS
BODY MASS INDEX: 35.55 KG/M2 | SYSTOLIC BLOOD PRESSURE: 128 MMHG | OXYGEN SATURATION: 98 % | DIASTOLIC BLOOD PRESSURE: 84 MMHG | WEIGHT: 227 LBS | HEART RATE: 94 BPM

## 2024-09-23 DIAGNOSIS — F41.9 ANXIETY: ICD-10-CM

## 2024-09-23 DIAGNOSIS — K58.0 IRRITABLE BOWEL SYNDROME WITH DIARRHEA: Primary | ICD-10-CM

## 2024-09-23 DIAGNOSIS — Z23 NEED FOR INFLUENZA VACCINATION: ICD-10-CM

## 2024-09-23 PROCEDURE — 90661 CCIIV3 VAC ABX FR 0.5 ML IM: CPT | Performed by: INTERNAL MEDICINE

## 2024-09-23 PROCEDURE — 99213 OFFICE O/P EST LOW 20 MIN: CPT | Performed by: INTERNAL MEDICINE

## 2024-09-23 PROCEDURE — 90471 IMMUNIZATION ADMIN: CPT | Performed by: INTERNAL MEDICINE

## 2024-09-23 RX ORDER — DICYCLOMINE HYDROCHLORIDE 10 MG/1
CAPSULE ORAL
Qty: 90 CAPSULE | Refills: 0 | Status: SHIPPED | OUTPATIENT
Start: 2024-09-23

## 2024-09-30 DIAGNOSIS — K58.0 IRRITABLE BOWEL SYNDROME WITH DIARRHEA: ICD-10-CM

## 2024-09-30 RX ORDER — DICYCLOMINE HYDROCHLORIDE 10 MG/1
CAPSULE ORAL
Qty: 540 CAPSULE | Refills: 1 | OUTPATIENT
Start: 2024-09-30

## 2025-01-16 ENCOUNTER — TELEMEDICINE (OUTPATIENT)
Dept: INTERNAL MEDICINE CLINIC | Age: 45
End: 2025-01-16

## 2025-01-16 VITALS — WEIGHT: 222 LBS | HEIGHT: 67 IN | BODY MASS INDEX: 34.84 KG/M2

## 2025-01-16 DIAGNOSIS — K58.0 IRRITABLE BOWEL SYNDROME WITH DIARRHEA: ICD-10-CM

## 2025-01-16 DIAGNOSIS — E66.09 CLASS 1 OBESITY DUE TO EXCESS CALORIES WITHOUT SERIOUS COMORBIDITY WITH BODY MASS INDEX (BMI) OF 34.0 TO 34.9 IN ADULT: Primary | ICD-10-CM

## 2025-01-16 DIAGNOSIS — E66.811 CLASS 1 OBESITY DUE TO EXCESS CALORIES WITHOUT SERIOUS COMORBIDITY WITH BODY MASS INDEX (BMI) OF 34.0 TO 34.9 IN ADULT: Primary | ICD-10-CM

## 2025-01-16 DIAGNOSIS — F41.9 ANXIETY: ICD-10-CM

## 2025-01-16 PROBLEM — E66.813 CLASS 3 SEVERE OBESITY DUE TO EXCESS CALORIES WITHOUT SERIOUS COMORBIDITY WITH BODY MASS INDEX (BMI) OF 40.0 TO 44.9 IN ADULT: Status: RESOLVED | Noted: 2024-02-19 | Resolved: 2025-01-16

## 2025-01-16 PROBLEM — E66.01 CLASS 3 SEVERE OBESITY DUE TO EXCESS CALORIES WITHOUT SERIOUS COMORBIDITY WITH BODY MASS INDEX (BMI) OF 40.0 TO 44.9 IN ADULT: Status: RESOLVED | Noted: 2024-02-19 | Resolved: 2025-01-16

## 2025-01-16 SDOH — ECONOMIC STABILITY: INCOME INSECURITY: IN THE LAST 12 MONTHS, WAS THERE A TIME WHEN YOU WERE NOT ABLE TO PAY THE MORTGAGE OR RENT ON TIME?: NO

## 2025-01-16 SDOH — ECONOMIC STABILITY: FOOD INSECURITY: WITHIN THE PAST 12 MONTHS, THE FOOD YOU BOUGHT JUST DIDN'T LAST AND YOU DIDN'T HAVE MONEY TO GET MORE.: NEVER TRUE

## 2025-01-16 SDOH — ECONOMIC STABILITY: TRANSPORTATION INSECURITY
IN THE PAST 12 MONTHS, HAS THE LACK OF TRANSPORTATION KEPT YOU FROM MEDICAL APPOINTMENTS OR FROM GETTING MEDICATIONS?: NO

## 2025-01-16 SDOH — ECONOMIC STABILITY: FOOD INSECURITY: WITHIN THE PAST 12 MONTHS, YOU WORRIED THAT YOUR FOOD WOULD RUN OUT BEFORE YOU GOT MONEY TO BUY MORE.: NEVER TRUE

## 2025-01-16 SDOH — ECONOMIC STABILITY: TRANSPORTATION INSECURITY
IN THE PAST 12 MONTHS, HAS LACK OF TRANSPORTATION KEPT YOU FROM MEETINGS, WORK, OR FROM GETTING THINGS NEEDED FOR DAILY LIVING?: NO

## 2025-01-16 NOTE — PROGRESS NOTES
Patient was evaluated through a synchronous (real-time) video encounter. Patient identification was verified at the start of the visit. She (or guardian if applicable) is aware that this is a billable service, which includes applicable co-pays. This visit was conducted with the patient's (and/or legal guardian's) verbal consent. She has not had a related appointment within my department in the past 7 days or scheduled within the next 24 hours.   The patient was located at Home: [address] [place] [id number].  The provider was located at Home (Appt Dept State): [place].    Date of Service:  1/16/2025    Chief Complaint:      Chief Complaint   Patient presents with    Weight Management    Anxiety    Depression    Irritable Bowel Syndrome       Assessment/Plan:    Karina was seen today for weight management, anxiety, depression and irritable bowel syndrome.    Diagnoses and all orders for this visit:    Class 1 obesity due to excess calories without serious comorbidity with body mass index (BMI) of 34.0 to 34.9 in adult  -     Tirzepatide 2.5 MG/0.5ML SOAJ; Inject 2.5 mg into the skin every 7 days    Anxiety    Irritable bowel syndrome with diarrhea    Stable and continue on current medications.    Return Fasting PE 2/19.      HPI:  Karina Jaramillo is a 44 y.o.      Obesity:  She lost a total of 44 # since started on Mounjaro 2.5 mg qweek in May 2024. Her current weight is 222 # (baseline weight 264#).  No side effects, constipation or nausea.  Not as much craving or hungry anymore.      Anxiety:  resolve on Zoloft 50 mg 1.5 evening.  No more anxiety with palpitations and shortness of breath.  No more emotional and crying weekly and she does feel happy.    IBS diarrhea:  stable on Bentyl 10 mg 4 times a week prn.  Mild Asthma:  stable off Singulair 10 mg evening     Lab Results   Component Value Date    LABA1C 5.2 02/05/2020     Lab Results   Component Value Date     02/19/2024    K 4.4

## 2025-01-20 ENCOUNTER — PATIENT MESSAGE (OUTPATIENT)
Dept: INTERNAL MEDICINE CLINIC | Age: 45
End: 2025-01-20

## 2025-01-20 ENCOUNTER — TELEMEDICINE (OUTPATIENT)
Dept: INTERNAL MEDICINE CLINIC | Age: 45
End: 2025-01-20
Payer: COMMERCIAL

## 2025-01-20 DIAGNOSIS — R51.9 HEADACHE DISORDER: Primary | ICD-10-CM

## 2025-01-20 PROCEDURE — 99213 OFFICE O/P EST LOW 20 MIN: CPT | Performed by: INTERNAL MEDICINE

## 2025-01-20 RX ORDER — SUMATRIPTAN SUCCINATE 100 MG/1
100 TABLET ORAL DAILY PRN
Qty: 9 TABLET | Refills: 0 | Status: SHIPPED | OUTPATIENT
Start: 2025-01-20

## 2025-01-20 NOTE — PROGRESS NOTES
Patient was evaluated through a synchronous (real-time) video encounter. Patient identification was verified at the start of the visit. She (or guardian if applicable) is aware that this is a billable service, which includes applicable co-pays. This visit was conducted with the patient's (and/or legal guardian's) verbal consent. She has not had a related appointment within my department in the past 7 days or scheduled within the next 24 hours.   The patient was located at Home: [address] [place] [id number].  The provider was located at Facility (Appt Dept): [address] [place] [address], [address]  [place] [address].    Date of Service:  1/20/2025    Chief Complaint:      Chief Complaint   Patient presents with    Migraine     Onset this AM       Assessment/Plan:    Karina was seen today for migraine.    Diagnoses and all orders for this visit:    Headache disorder  Start SUMAtriptan (IMITREX) 100 MG tablet; Take 1 tablet by mouth daily as needed for Migraine Can repeat in 2 hrs, maximum of 2 within 24 hr      Return if symptoms worsen or fail to improve.      HPI:  Karina Jaramillo is a 44 y.o.      She complain of waking up with a severe headache behind her eyes.  She did have some nausea and photophobia.  No upper respiratory symptoms, fever or chills.  She does not have a history of migraines.  No family history of migraines.      Lab Results   Component Value Date    LABA1C 5.2 02/05/2020     Lab Results   Component Value Date     02/19/2024    K 4.4 02/19/2024     02/19/2024    CO2 24 02/19/2024    BUN 10 02/19/2024    CREATININE 0.9 02/19/2024    GLUCOSE 110 (H) 02/19/2024    CALCIUM 10.0 02/19/2024     Lab Results   Component Value Date/Time    CHOL 184 02/19/2024 08:49 AM    TRIG 360 02/19/2024 08:49 AM    HDL 31 02/19/2024 08:49 AM    LDLDIRECT 76 02/19/2024 08:49 AM     Lab Results   Component Value Date    ALT 14 02/19/2024    AST 13 (L) 02/19/2024     Lab Results

## 2025-02-19 ENCOUNTER — OFFICE VISIT (OUTPATIENT)
Dept: INTERNAL MEDICINE CLINIC | Age: 45
End: 2025-02-19

## 2025-02-19 VITALS
DIASTOLIC BLOOD PRESSURE: 70 MMHG | OXYGEN SATURATION: 97 % | HEIGHT: 67 IN | SYSTOLIC BLOOD PRESSURE: 122 MMHG | HEART RATE: 92 BPM | BODY MASS INDEX: 35.79 KG/M2 | WEIGHT: 228 LBS

## 2025-02-19 DIAGNOSIS — Z00.00 ENCOUNTER FOR WELL ADULT EXAM WITHOUT ABNORMAL FINDINGS: Primary | ICD-10-CM

## 2025-02-19 DIAGNOSIS — J06.9 URI WITH COUGH AND CONGESTION: ICD-10-CM

## 2025-02-19 DIAGNOSIS — E66.811 CLASS 1 OBESITY DUE TO EXCESS CALORIES WITHOUT SERIOUS COMORBIDITY WITH BODY MASS INDEX (BMI) OF 34.0 TO 34.9 IN ADULT: ICD-10-CM

## 2025-02-19 DIAGNOSIS — E66.09 CLASS 1 OBESITY DUE TO EXCESS CALORIES WITHOUT SERIOUS COMORBIDITY WITH BODY MASS INDEX (BMI) OF 34.0 TO 34.9 IN ADULT: ICD-10-CM

## 2025-02-19 DIAGNOSIS — F41.9 ANXIETY: ICD-10-CM

## 2025-02-19 RX ORDER — ALBUTEROL SULFATE 90 UG/1
2 INHALANT RESPIRATORY (INHALATION) 4 TIMES DAILY PRN
Qty: 18 G | Refills: 1 | Status: SHIPPED | OUTPATIENT
Start: 2025-02-19

## 2025-02-19 ASSESSMENT — PATIENT HEALTH QUESTIONNAIRE - PHQ9
SUM OF ALL RESPONSES TO PHQ QUESTIONS 1-9: 0
2. FEELING DOWN, DEPRESSED OR HOPELESS: NOT AT ALL
SUM OF ALL RESPONSES TO PHQ QUESTIONS 1-9: 0
SUM OF ALL RESPONSES TO PHQ9 QUESTIONS 1 & 2: 0
1. LITTLE INTEREST OR PLEASURE IN DOING THINGS: NOT AT ALL

## 2025-02-19 NOTE — PROGRESS NOTES
[facility]  History and Physical  Rcahell Pruitt M.D.        Karina Jaramillo  YOB: 1980    Date of Service:  2/19/2025    Chief Complaint:   Karina Jaramillo is a 44 y.o. female who presents for   Chief Complaint   Patient presents with    Annual Exam     Fasting physical    Congestion     Sinus drainage and congestion onset yesterday. No Fever       Assessment/Plan:    Karina was seen today for annual exam and congestion.    Diagnoses and all orders for this visit:    Encounter for well adult exam without abnormal findings  -     Lipid Panel  -     Comprehensive Metabolic Panel  -     CBC with Auto Differential    Anxiety  -     sertraline (ZOLOFT) 50 MG tablet; Take 1.5 tablets by mouth nightly    URI with cough and congestion  -     albuterol sulfate HFA (VENTOLIN HFA) 108 (90 Base) MCG/ACT inhaler; Inhale 2 puffs into the lungs 4 times daily as needed for Wheezing    Class 1 obesity due to excess calories without serious comorbidity with body mass index (BMI) of 34.0 to 34.9 in adult    Goals:   -Cut back on portion size and carbohydrate intake  -Eat small amounts of food   -Avoid high fat and high sugar foods  -Include protein with all meals and snacks  -Avoid carbonation and caffeine  -Avoid calorie containing beverages  -Increase physical activity as tolerated    Return [facility] Weight Management 5/22.      HPI: Here for Annual Physical and Follow up.    She complain of head congestion for 2 days.  She has an occasional dry cough.  No sore throat, myaglia or fever or chills.    Headache:  stable and not had recurrent headache on Imitrex 100 mg 1/2 as needed    Obesity:  She lost a total of 36 # since started on Mounjaro 2.5 mg qweek in May 2024. Her current weight is 228 # (baseline weight 264#).  No side effects, constipation or nausea.  Not as much craving or hungry anymore.      Anxiety:  resolve on Zoloft 50 mg 1.5 evening.  No more anxiety with palpitations and

## 2025-02-20 LAB
ALBUMIN SERPL-MCNC: 4.2 G/DL (ref 3.4–5)
ALBUMIN/GLOB SERPL: 1.5 {RATIO} (ref 1.1–2.2)
ALP SERPL-CCNC: 84 U/L (ref 40–129)
ALT SERPL-CCNC: 21 U/L (ref 10–40)
ANION GAP SERPL CALCULATED.3IONS-SCNC: 10 MMOL/L (ref 3–16)
AST SERPL-CCNC: 20 U/L (ref 15–37)
BASOPHILS # BLD: 0.1 K/UL (ref 0–0.2)
BASOPHILS NFR BLD: 0.5 %
BILIRUB SERPL-MCNC: 0.3 MG/DL (ref 0–1)
BUN SERPL-MCNC: 16 MG/DL (ref 7–20)
CALCIUM SERPL-MCNC: 11.4 MG/DL (ref 8.3–10.6)
CHLORIDE SERPL-SCNC: 107 MMOL/L (ref 99–110)
CHOLEST SERPL-MCNC: 191 MG/DL (ref 0–199)
CO2 SERPL-SCNC: 22 MMOL/L (ref 21–32)
CREAT SERPL-MCNC: 0.9 MG/DL (ref 0.6–1.1)
DEPRECATED RDW RBC AUTO: 14.6 % (ref 12.4–15.4)
EOSINOPHIL # BLD: 0.2 K/UL (ref 0–0.6)
EOSINOPHIL NFR BLD: 1.8 %
GFR SERPLBLD CREATININE-BSD FMLA CKD-EPI: 82 ML/MIN/{1.73_M2}
GLUCOSE SERPL-MCNC: 80 MG/DL (ref 70–99)
HCT VFR BLD AUTO: 42.5 % (ref 36–48)
HDLC SERPL-MCNC: 42 MG/DL (ref 40–60)
HGB BLD-MCNC: 13.8 G/DL (ref 12–16)
LDLC SERPL CALC-MCNC: 118 MG/DL
LYMPHOCYTES # BLD: 2.7 K/UL (ref 1–5.1)
LYMPHOCYTES NFR BLD: 22.5 %
MCH RBC QN AUTO: 27.7 PG (ref 26–34)
MCHC RBC AUTO-ENTMCNC: 32.5 G/DL (ref 31–36)
MCV RBC AUTO: 85.2 FL (ref 80–100)
MONOCYTES # BLD: 0.7 K/UL (ref 0–1.3)
MONOCYTES NFR BLD: 6.1 %
NEUTROPHILS # BLD: 8.2 K/UL (ref 1.7–7.7)
NEUTROPHILS NFR BLD: 69.1 %
PLATELET # BLD AUTO: 174 K/UL (ref 135–450)
PMV BLD AUTO: 12.2 FL (ref 5–10.5)
POTASSIUM SERPL-SCNC: 4.5 MMOL/L (ref 3.5–5.1)
PROT SERPL-MCNC: 7 G/DL (ref 6.4–8.2)
RBC # BLD AUTO: 4.99 M/UL (ref 4–5.2)
SODIUM SERPL-SCNC: 139 MMOL/L (ref 136–145)
TRIGL SERPL-MCNC: 153 MG/DL (ref 0–150)
VLDLC SERPL CALC-MCNC: 31 MG/DL
WBC # BLD AUTO: 11.9 K/UL (ref 4–11)

## 2025-02-25 DIAGNOSIS — F41.9 ANXIETY: ICD-10-CM

## 2025-03-25 ENCOUNTER — OFFICE VISIT (OUTPATIENT)
Dept: INTERNAL MEDICINE CLINIC | Age: 45
End: 2025-03-25

## 2025-03-25 VITALS
WEIGHT: 228 LBS | SYSTOLIC BLOOD PRESSURE: 152 MMHG | HEIGHT: 69 IN | HEART RATE: 82 BPM | BODY MASS INDEX: 33.77 KG/M2 | OXYGEN SATURATION: 98 % | DIASTOLIC BLOOD PRESSURE: 70 MMHG

## 2025-03-25 DIAGNOSIS — E83.52 HYPERCALCEMIA: ICD-10-CM

## 2025-03-25 DIAGNOSIS — D05.12 DUCTAL CARCINOMA IN SITU (DCIS) OF LEFT BREAST: ICD-10-CM

## 2025-03-25 DIAGNOSIS — D05.11 DUCTAL CARCINOMA IN SITU (DCIS) OF RIGHT BREAST: Primary | ICD-10-CM

## 2025-03-25 DIAGNOSIS — Z01.818 PREOP EXAM FOR INTERNAL MEDICINE: ICD-10-CM

## 2025-03-25 NOTE — PROGRESS NOTES
PREOPERATIVE CONSULTATION      Karina Jaramillo  YOB: 1980    Date of Service:  3/25/2025    Chief Complaint   Patient presents with    Pre-op Exam     Ductal Carcinoma procedure is on April 21st at [facility].   Dr. Rakan Green  Fax: [phone]         Assessment/Plan:    Karina \"Tricia" was seen today for pre-op exam.    Diagnoses and all orders for this visit:    Ductal carcinoma in situ (DCIS) of right breast    Ductal carcinoma in situ (DCIS) of left breast    Preop exam for internal medicine    Hypercalcemia  -     CALCIUM IONIZED SERUM; [facility]  -     [facility] - Norma Garcia MD, Endocrinology, [facility]    Stable and continue on current medications.    No contraindications to planned surgery      HPI:  Karina Jaramillo is a 44 y.o.    This patient presents to the office today for a preoperative consultation at the request of surgeon, Dr. RAKAN GREEN, who plans on performing BILATERAL LUMPECTOMY on April 21 at [facility].  The current problem began 3 weeks ago, and symptoms have been unchanged with time.       44 y.o. patient with planned surgery as above.    SURGERY SPECIFIC RISK:  none  Known risk factors for perioperative complications: None  Current medications which may produce withdrawal symptoms if withheld perioperatively: none     1. Preoperative workup as follows: none  2. Change in medication regimen before surgery: no Tirzepatide 2 weeks prior to surgery  3. Prophylaxis for cardiac events with perioperative beta-blockers: Not indicated  ACC/AHA indications for pre-operative beta-blocker use:    Vascular surgery with history of postitive stress test  Intermediate or high risk surgery with history of CAD   Intermediate or high risk surgery with multiple clinical predictors of CAD- 2 of the following: history of compensated or prior heart failure, history of cerebrovascular disease, DM, or renal insufficiency  Routine administration of higher-dose, long-acting metoprolol in

## 2025-03-27 ENCOUNTER — RESULTS FOLLOW-UP (OUTPATIENT)
Dept: INTERNAL MEDICINE CLINIC | Age: 45
End: 2025-03-27

## 2025-03-27 LAB
CA-I ADJ PH7.4 SERPL-SCNC: 1.51 MMOL/L (ref 1.09–1.3)
CA-I SERPL ISE-SCNC: 1.51 MMOL/L (ref 1.09–1.3)